# Patient Record
Sex: FEMALE | Race: WHITE | Employment: OTHER | URBAN - METROPOLITAN AREA
[De-identification: names, ages, dates, MRNs, and addresses within clinical notes are randomized per-mention and may not be internally consistent; named-entity substitution may affect disease eponyms.]

---

## 2020-01-29 ENCOUNTER — OFFICE VISIT (OUTPATIENT)
Dept: NEUROSURGERY | Age: 85
End: 2020-01-29
Payer: MEDICARE

## 2020-01-29 VITALS
BODY MASS INDEX: 28.32 KG/M2 | HEART RATE: 68 BPM | SYSTOLIC BLOOD PRESSURE: 145 MMHG | WEIGHT: 170 LBS | HEIGHT: 65 IN | DIASTOLIC BLOOD PRESSURE: 79 MMHG

## 2020-01-29 PROCEDURE — 1090F PRES/ABSN URINE INCON ASSESS: CPT | Performed by: NEUROLOGICAL SURGERY

## 2020-01-29 PROCEDURE — G8427 DOCREV CUR MEDS BY ELIG CLIN: HCPCS | Performed by: NEUROLOGICAL SURGERY

## 2020-01-29 PROCEDURE — 99203 OFFICE O/P NEW LOW 30 MIN: CPT | Performed by: NEUROLOGICAL SURGERY

## 2020-01-29 PROCEDURE — G8484 FLU IMMUNIZE NO ADMIN: HCPCS | Performed by: NEUROLOGICAL SURGERY

## 2020-01-29 PROCEDURE — G8417 CALC BMI ABV UP PARAM F/U: HCPCS | Performed by: NEUROLOGICAL SURGERY

## 2020-01-29 RX ORDER — CALCITRIOL 0.25 UG/1
0.25 CAPSULE, LIQUID FILLED ORAL DAILY
COMMUNITY

## 2020-01-29 RX ORDER — ALLOPURINOL 300 MG/1
300 TABLET ORAL DAILY
COMMUNITY

## 2020-01-29 RX ORDER — POTASSIUM CITRATE 10 MEQ/1
TABLET, EXTENDED RELEASE ORAL
COMMUNITY

## 2020-01-29 ASSESSMENT — ENCOUNTER SYMPTOMS
BACK PAIN: 1
PHOTOPHOBIA: 0
SHORTNESS OF BREATH: 0
ABDOMINAL PAIN: 0
TROUBLE SWALLOWING: 0

## 2020-01-29 NOTE — PROGRESS NOTES
breath) on exertion     Thyroid disease     hypo    Unspecified cerebral artery occlusion with cerebral infarction     4 minor stroke  last      Past Surgical History:   Procedure Laterality Date    BREAST SURGERY Right     biopsy    CARDIAC CATHETERIZATION   ? \" during procedure\" in [address]        History reviewed. No pertinent family history.    Social History     Socioeconomic History    Marital status:      Spouse name: Not on file    Number of children: Not on file    Years of education: Not on file    Highest education level: Not on file   Occupational History    Not on file   Social Needs    Financial resource strain: Not on file    Food insecurity:     Worry: Not on file     Inability: Not on file    Transportation needs:     Medical: Not on file     Non-medical: Not on file   Tobacco Use    Smoking status: Never Smoker    Smokeless tobacco: Never Used   Substance and Sexual Activity    Alcohol use: No    Drug use: No    Sexual activity: Not on file   Lifestyle    Physical activity:     Days per week: Not on file     Minutes per session: Not on file    Stress: Not on file   Relationships    Social connections:     Talks on phone: Not on file     Gets together: Not on file     Attends Buddhism service: Not on file     Active member of club or organization: Not on file     Attends meetings of clubs or organizations: Not on file     Relationship status: Not on file    Intimate partner violence:     Fear of current or ex partner: Not on file     Emotionally abused: Not on file     Physically abused: Not on file     Forced sexual activity: Not on file   Other Topics Concern    Not on file   Social History Narrative    Not on file       Medications:   Current Outpatient Medications   Medication Sig Dispense Refill    calcitRIOL (ROCALTROL) 0.25 MCG capsule Take 0.25 mcg by mouth daily      allopurinol (ZYLOPRIM) 300 MG tablet Take

## 2020-03-11 ENCOUNTER — HOSPITAL ENCOUNTER (OUTPATIENT)
Age: 85
Discharge: HOME | End: 2020-03-13
Payer: MEDICARE

## 2020-03-11 PROCEDURE — 87088 URINE BACTERIA CULTURE: CPT

## 2020-03-11 PROCEDURE — 87186 SC STD MICRODIL/AGAR DIL: CPT

## 2020-03-13 LAB
ORGANISM: ABNORMAL
URINE CULTURE, ROUTINE: ABNORMAL

## 2020-05-04 ENCOUNTER — PREP FOR PROCEDURE (OUTPATIENT)
Dept: UROLOGY | Age: 85
End: 2020-05-04

## 2020-05-04 RX ORDER — CIPROFLOXACIN 2 MG/ML
400 INJECTION, SOLUTION INTRAVENOUS
Status: CANCELLED | OUTPATIENT
Start: 2020-05-04 | End: 2020-05-04

## 2020-05-04 RX ORDER — SODIUM CHLORIDE 9 MG/ML
INJECTION, SOLUTION INTRAVENOUS CONTINUOUS
Status: CANCELLED | OUTPATIENT
Start: 2020-05-04

## 2021-09-17 NOTE — H&P
[address]                                HISTORY AND PHYSICAL    PATIENT NAME: Benedicto Toribio                   :        1935  MED REC NO:   [id number]                            ROOM:  ACCOUNT NO:   [de-identified]                           ADMIT DATE: 2021  PROVIDER:     Aamir Murray MD    CHIEF COMPLAINT:  Upper abdominal pain. HISTORY OF PRESENT ILLNESS:  The patient is 82year-old with upper abdominal  pains and discomfort. ALLERGIES:  None. HABITS:  Denies. CURRENT MEDICATIONS:  Tramadol, alprazolam , pantoprazole, verapamil, Lasix,  simvastatin, calcitriol, Synthroid, lisinopril, allopurinol, Coumadin,  multivitamins. PAST MEDICAL HISTORY:  Hypothyroidism, rheumatoid arthritis, heart block,  atrial fibrillation, gout, renal failure, gait disability, remote stroke. PAST SURGICAL HISTORY:  Peptic ulcer, leg surgery, cardiac cath, breast  biopsy. PHYSICAL EXAMINATION:  GENERAL:  Alert and oriented. VITAL SIGNS:  /80, pulse 72, respirations 16, temperature 97.1. HEENT:  One oral cavity negative. NECK:  Negative. The peripheral lymph nodes are not palpable. LUNGS:  Clear to auscultation. BREASTS:  Deferred. CARDIOVASCULAR:  Heart sounds regular. ABDOMEN:  Soft. No palpable masses. RECTAL:  Deferred. GENITALIA:  Deferred. EXTREMITIES:  Negative. NEUROLOGIC:  Oriented to time and space. No gross deficit. IMPRESSION:  An 82-year-old female with dyspeptic symptoms and upper  gastrointestinal distress with history of complicated peptic ulcer disease  (bleeding episode) that required surgery. She is still on anticoagulation. PLAN:  EGD.         Perez Barraza MD    D: 2021 13:26:38       T: 2021 13:29:00     [email or web address]  Job#: [id number]     Doc#: [id number]

## 2021-09-22 ENCOUNTER — ANESTHESIA EVENT (OUTPATIENT)
Dept: ENDOSCOPY | Age: 86
End: 2021-09-22
Payer: MEDICARE

## 2021-09-22 ASSESSMENT — ENCOUNTER SYMPTOMS: SHORTNESS OF BREATH: 1

## 2021-09-22 NOTE — ANESTHESIA PRE PROCEDURE
Department of Anesthesiology  Preprocedure Note       Name:  Mal Joseph   Age:  82 y.o.  :  1935                                          MRN:  [id number]         Date:  2021      Surgeon: Rocio Murphy):  Best Bynum MD    Procedure: Procedure(s):  EGD ESOPHAGOGASTRODUODENOSCOPY    Medications prior to admission:   Prior to Admission medications    Medication Sig Start Date End Date Taking? Authorizing Provider   calcitRIOL (ROCALTROL) 0.25 MCG capsule Take 0.25 mcg by mouth daily    Historical Provider, MD   allopurinol (ZYLOPRIM) 300 MG tablet Take 300 mg by mouth daily    Historical Provider, MD   potassium citrate (UROCIT-K) 10 MEQ (1080 MG) extended release tablet Take by mouth 3 times daily (with meals)    Historical Provider, MD   citric acid-potassium citrate (POLYCITRA) 1100-334 MG/5ML solution Take  by mouth 3 times daily (with meals). Historical Provider, MD   predniSONE (DELTASONE) 5 MG tablet Take 5 mg by mouth daily. Historical Provider, MD   pyridoxine (B-6) 100 MG tablet Take 100 mg by mouth daily. Historical Provider, MD   lisinopril (PRINIVIL;ZESTRIL) 20 MG tablet Take 20 mg by mouth every morning. Historical Provider, MD   verapamil (VERELAN PM) 240 MG CR capsule Take 240 mg by mouth every morning. Historical Provider, MD   furosemide (LASIX) 40 MG tablet Take 20 mg by mouth daily     Historical Provider, MD   ALPRAZolam (XANAX) 0.25 MG tablet Take 0.25 mg by mouth 2 times daily as needed. Historical Provider, MD   simvastatin (ZOCOR) 10 MG tablet Take 10 mg by mouth nightly. Historical Provider, MD   levothyroxine (SYNTHROID) 50 MCG tablet Take 50 mcg by mouth Daily. Historical Provider, MD   hydroxychloroquine (PLAQUENIL) 200 MG tablet Take 200 mg by mouth every morning. Historical Provider, MD   warfarin (COUMADIN) 5 MG tablet Take 6 mg by mouth Daily.  Stopped for procedure--Last dose 13    Historical Provider, MD   therapeutic multivitamin-minerals (THERAGRAN-M) tablet Take 1 tablet by mouth daily. Historical Provider, MD   Omega-3 Fatty Acids (FISH OIL) 1000 MG CPDR Take 1 capsule by mouth daily. Historical Provider, MD   Vitamin D (CHOLECALCIFEROL) 1000 UNITS CAPS capsule Take 2,000 Units by mouth daily. Vitamin d 3    Historical Provider, MD   traMADol (ULTRAM) 50 MG tablet Take 50 mg by mouth 2 times daily. Historical Provider, MD   Acetaminophen (EQ ARTHRITIS PAIN PO) Take 650 mg by mouth 2 times daily. Historical Provider, MD       Current medications:    No current facility-administered medications for this encounter. Current Outpatient Medications   Medication Sig Dispense Refill    calcitRIOL (ROCALTROL) 0.25 MCG capsule Take 0.25 mcg by mouth daily      allopurinol (ZYLOPRIM) 300 MG tablet Take 300 mg by mouth daily      potassium citrate (UROCIT-K) 10 MEQ (1080 MG) extended release tablet Take by mouth 3 times daily (with meals)      citric acid-potassium citrate (POLYCITRA) 1100-334 MG/5ML solution Take  by mouth 3 times daily (with meals).  predniSONE (DELTASONE) 5 MG tablet Take 5 mg by mouth daily.  pyridoxine (B-6) 100 MG tablet Take 100 mg by mouth daily.  lisinopril (PRINIVIL;ZESTRIL) 20 MG tablet Take 20 mg by mouth every morning.  verapamil (VERELAN PM) 240 MG CR capsule Take 240 mg by mouth every morning.  furosemide (LASIX) 40 MG tablet Take 20 mg by mouth daily       ALPRAZolam (XANAX) 0.25 MG tablet Take 0.25 mg by mouth 2 times daily as needed.  simvastatin (ZOCOR) 10 MG tablet Take 10 mg by mouth nightly.  levothyroxine (SYNTHROID) 50 MCG tablet Take 50 mcg by mouth Daily.  hydroxychloroquine (PLAQUENIL) 200 MG tablet Take 200 mg by mouth every morning.  warfarin (COUMADIN) 5 MG tablet Take 6 mg by mouth Daily. Stopped for procedure--Last dose 9/6/13      therapeutic multivitamin-minerals (THERAGRAN-M) tablet Take 1 tablet by mouth daily.       Omega-3 Fatty Acids (FISH OIL) 1000 MG CPDR Take 1 capsule by mouth daily.  Vitamin D (CHOLECALCIFEROL) 1000 UNITS CAPS capsule Take 2,000 Units by mouth daily. Vitamin d 3      traMADol (ULTRAM) 50 MG tablet Take 50 mg by mouth 2 times daily.  Acetaminophen (EQ ARTHRITIS PAIN PO) Take 650 mg by mouth 2 times daily. Allergies: Allergies   Allergen Reactions    Penicillins Anaphylaxis    Sulfa Antibiotics Nausea And Vomiting     Stomach pain        Problem List:  There is no problem list on file for this patient. Past Medical History:        Diagnosis Date    AF [facility]at[facility] fibrillation) ([facility])     Anemia     Anesthesia complication     \"\"during heart cath  and another surgery duodenal ulcer 3rd day post  states    stopped breathing    Arthritis     rheumatoid    Back pain, chronic     ruptured, buldging, sciatica     Bursitis of hip     bilateral   injections given 13    CHF (congestive heart failure) ([facility])     Duodenal ulcer with hemorrhage and perforation ([facility] [address]    Hx of blood clots ? right leg    Hyperlipidemia     Hypertension     SOB (shortness of breath) on exertion     Thyroid disease     hypo    Unspecified cerebral artery occlusion with cerebral infarction     4 minor stroke  last        Past Surgical History:        Procedure Laterality Date    BREAST SURGERY Right     biopsy    CARDIAC CATHETERIZATION   ? \" during procedure\" in [address]         Social History:    Social History     Tobacco Use    Smoking status: Never Smoker    Smokeless tobacco: Never Used   Substance Use Topics    Alcohol use: No                                Counseling given: Not Answered      Vital Signs (Current): There were no vitals filed for this visit.                                            BP Readings from Last 3 Encounters:   20 (!) 145/79   09/10/13 118/60   13 116/64       NPO Status: BMI:   Wt Readings from Last 3 Encounters:   20 170 lb (77.1 kg)   09/10/13 170 lb (77.1 kg)   13 171 lb (77.6 kg)     There is no height or weight on file to calculate BMI.    CBC:   Lab Results   Component Value Date    WBC 6.3 2013    RBC 3.84 2013    HGB 11.6 2013    HCT 33.5 2013    MCV 87.2 2013    RDW 14.1 2013     2013       CMP:   Lab Results   Component Value Date     2013    K 3.9 2013     2013    CO2 30 2013    BUN 15 2013    CREATININE 0.9 2013    LABGLOM >60 2013    GLUCOSE 86 2013    CALCIUM 9.5 2013       POC Tests: No results for input(s): POCGLU, POCNA, POCK, POCCL, POCBUN, POCHEMO, POCHCT in the last 72 hours. Coags:   Lab Results   Component Value Date    PROTIME 14.0 09/10/2013    INR 1.5 09/10/2013    APTT 32.0 09/10/2013       HCG (If Applicable): No results found for: PREGTESTUR, PREGSERUM, HCG, HCGQUANT     ABGs: No results found for: PHART, PO2ART, SPR9ORW, MYN8HBE, BEART, H3ZXDAJB     Type & Screen (If Applicable):  No results found for: LABABO, LABRH    Drug/Infectious Status (If Applicable):  No results found for: HIV, HEPCAB    COVID-19 Screening (If Applicable): No results found for: COVID19        Anesthesia Evaluation  Patient summary reviewed history of anesthetic complications ( \"\"during heart cath  and another surgery duodenal ulcer 3rd day post  states    stopped breathing):   Airway: Mallampati: III  TM distance: >3 FB   Neck ROM: full  Mouth opening: > = 3 FB Dental:      Comment: Upper edentulous, lower front incisors remain. Right lateral incisor is badly deteriorated.     Pulmonary:normal exam  breath sounds clear to auscultation  (+) shortness of breath:                             Cardiovascular:  Exercise tolerance: poor (<4 METS),   (+) hypertension:, CHF:, GARCIA:, hyperlipidemia        Rhythm: regular  Rate: normal                    Neuro/Psych:   (+) CVA ( Unspecified cerebral artery occlusion with cerebral infarction , CVA x 2 with left sided weakness which improved.):, neuromuscular disease ( Back pain, chronic ruptured, buldging, sciatica  Bursitis of hip bilateral   injections given 6/28/13):,             GI/Hepatic/Renal:   (+) PUD,          ROS comment: Duodenal ulcer with hemorrhage and perforation . Endo/Other:                      ROS comment: Gout Abdominal:             Vascular: negative vascular ROS. Other Findings:           Anesthesia Plan      MAC     ASA 4       Induction: intravenous. Anesthetic plan and risks discussed with patient. Plan discussed with CRNA.                 Margarette Ibanez MD   9/22/2021

## 2021-09-22 NOTE — PROGRESS NOTES
[address] [facility]                                                                                                                    PRE OP INSTRUCTIONS FOR  Ned Melendez        Date: 9/22/2021    Date of surgery: 9/22/2021   Arrival Time: Hospital will call you between 5pm and 7pm with your final arrival time for surgery    1. Do not eat or drink anything after  Midnight  prior to surgery. This includes no water, chewing gum, mints or ice chips. 2. Take the following medications with a small sip of water on the morning of Surgery:  Take verapamil  dos with sip water    3. Diabetics may take evening dose of insulin but none after midnight. If you feel symptomatic or low blood sugar morning of surgery drink 1-2 ounces of apple juice only. 4. Aspirin, Ibuprofen, Advil, Naproxen, Vitamin E and other Anti-inflammatory products should be stopped  before surgery  as directed by your physician. Take Tylenol only unless instructed otherwise by your surgeon. 5. Check with your Doctor regarding stopping Plavix, Coumadin, Lovenox, Eliquis, Effient, or other blood thinners. 6. Do not smoke,use illicit drugs and do not drink any alcoholic beverages 24 hours prior to surgery. 7. You may brush your teeth the morning of surgery. DO NOT SWALLOW WATER    8. You MUST make arrangements for a responsible adult to take you home after your surgery. You will not be allowed to leave alone or drive yourself home. It is strongly suggested someone stay with you the first 24 hrs. Your surgery will be cancelled if you do not have a ride home. 9. PEDIATRIC PATIENTS ONLY:  A parent/legal guardian must accompany a child scheduled for surgery and plan to stay at the hospital until the child is discharged. Please do not bring other children with you.     10. Please wear simple, loose fitting clothing to the hospital.  Do not bring valuables (money, credit cards, checkbooks, etc.) Do not wear any makeup

## 2021-09-23 ENCOUNTER — ANESTHESIA (OUTPATIENT)
Dept: ENDOSCOPY | Age: 86
End: 2021-09-23
Payer: MEDICARE

## 2021-09-23 ENCOUNTER — HOSPITAL ENCOUNTER (OUTPATIENT)
Age: 86
Setting detail: OUTPATIENT SURGERY
Discharge: HOME | End: 2021-09-23
Attending: INTERNAL MEDICINE | Admitting: INTERNAL MEDICINE
Payer: MEDICARE

## 2021-09-23 VITALS — SYSTOLIC BLOOD PRESSURE: 107 MMHG | DIASTOLIC BLOOD PRESSURE: 58 MMHG | OXYGEN SATURATION: 95 %

## 2021-09-23 VITALS
OXYGEN SATURATION: 93 % | RESPIRATION RATE: 18 BRPM | HEART RATE: 60 BPM | BODY MASS INDEX: 28.16 KG/M2 | TEMPERATURE: 60 F | HEIGHT: 65 IN | DIASTOLIC BLOOD PRESSURE: 73 MMHG | WEIGHT: 169 LBS | SYSTOLIC BLOOD PRESSURE: 135 MMHG

## 2021-09-23 LAB
APTT: 41.6 SEC (ref 24.5–35.1)
INR BLD: 1.4
PROTHROMBIN TIME: 16.3 SEC (ref 9.3–12.4)

## 2021-09-23 PROCEDURE — 3700000000 HC ANESTHESIA ATTENDED CARE: Performed by: INTERNAL MEDICINE

## 2021-09-23 PROCEDURE — 3700000001 HC ADD 15 MINUTES (ANESTHESIA): Performed by: INTERNAL MEDICINE

## 2021-09-23 PROCEDURE — 2580000003 HC RX 258: Performed by: INTERNAL MEDICINE

## 2021-09-23 PROCEDURE — 88342 IMHCHEM/IMCYTCHM 1ST ANTB: CPT

## 2021-09-23 PROCEDURE — 6360000002 HC RX W HCPCS: Performed by: INTERNAL MEDICINE

## 2021-09-23 PROCEDURE — 2580000003 HC RX 258: Performed by: ANESTHESIOLOGY

## 2021-09-23 PROCEDURE — 36415 COLL VENOUS BLD VENIPUNCTURE: CPT

## 2021-09-23 PROCEDURE — 3609012400 HC EGD TRANSORAL BIOPSY SINGLE/MULTIPLE: Performed by: INTERNAL MEDICINE

## 2021-09-23 PROCEDURE — 88305 TISSUE EXAM BY PATHOLOGIST: CPT

## 2021-09-23 PROCEDURE — 6360000002 HC RX W HCPCS: Performed by: NURSE ANESTHETIST, CERTIFIED REGISTERED

## 2021-09-23 PROCEDURE — 2709999900 HC NON-CHARGEABLE SUPPLY: Performed by: INTERNAL MEDICINE

## 2021-09-23 PROCEDURE — 85610 PROTHROMBIN TIME: CPT

## 2021-09-23 PROCEDURE — 7100000010 HC PHASE II RECOVERY - FIRST 15 MIN: Performed by: INTERNAL MEDICINE

## 2021-09-23 PROCEDURE — 7100000011 HC PHASE II RECOVERY - ADDTL 15 MIN: Performed by: INTERNAL MEDICINE

## 2021-09-23 PROCEDURE — 2580000003 HC RX 258: Performed by: NURSE ANESTHETIST, CERTIFIED REGISTERED

## 2021-09-23 PROCEDURE — 85730 THROMBOPLASTIN TIME PARTIAL: CPT

## 2021-09-23 RX ORDER — PROPOFOL 10 MG/ML
INJECTION, EMULSION INTRAVENOUS PRN
Status: DISCONTINUED | OUTPATIENT
Start: 2021-09-23 | End: 2021-09-23 | Stop reason: SDUPTHER

## 2021-09-23 RX ORDER — SODIUM CHLORIDE 0.9 % (FLUSH) 0.9 %
5-40 SYRINGE (ML) INJECTION PRN
Status: DISCONTINUED | OUTPATIENT
Start: 2021-09-23 | End: 2021-09-23 | Stop reason: HOSPADM

## 2021-09-23 RX ORDER — SODIUM CHLORIDE, SODIUM LACTATE, POTASSIUM CHLORIDE, CALCIUM CHLORIDE 600; 310; 30; 20 MG/100ML; MG/100ML; MG/100ML; MG/100ML
INJECTION, SOLUTION INTRAVENOUS CONTINUOUS
Status: DISCONTINUED | OUTPATIENT
Start: 2021-09-23 | End: 2021-09-23 | Stop reason: HOSPADM

## 2021-09-23 RX ORDER — SODIUM CHLORIDE 9 MG/ML
INJECTION, SOLUTION INTRAVENOUS CONTINUOUS PRN
Status: DISCONTINUED | OUTPATIENT
Start: 2021-09-23 | End: 2021-09-23 | Stop reason: SDUPTHER

## 2021-09-23 RX ADMIN — VANCOMYCIN HYDROCHLORIDE 500 MG: 500 INJECTION, POWDER, LYOPHILIZED, FOR SOLUTION INTRAVENOUS at 07:44

## 2021-09-23 RX ADMIN — PROPOFOL 10 MG: 10 INJECTION, EMULSION INTRAVENOUS at 08:06

## 2021-09-23 RX ADMIN — SODIUM CHLORIDE: 9 INJECTION, SOLUTION INTRAVENOUS at 07:49

## 2021-09-23 RX ADMIN — SODIUM CHLORIDE, POTASSIUM CHLORIDE, SODIUM LACTATE AND CALCIUM CHLORIDE: 600; 310; 30; 20 INJECTION, SOLUTION INTRAVENOUS at 07:44

## 2021-09-23 RX ADMIN — PROPOFOL 20 MG: 10 INJECTION, EMULSION INTRAVENOUS at 08:02

## 2021-09-23 RX ADMIN — PROPOFOL 40 MG: 10 INJECTION, EMULSION INTRAVENOUS at 07:59

## 2021-09-23 RX ADMIN — PROPOFOL 20 MG: 10 INJECTION, EMULSION INTRAVENOUS at 08:01

## 2021-09-23 RX ADMIN — PROPOFOL 30 MG: 10 INJECTION, EMULSION INTRAVENOUS at 08:00

## 2021-09-23 RX ADMIN — PROPOFOL 20 MG: 10 INJECTION, EMULSION INTRAVENOUS at 08:04

## 2021-09-23 ASSESSMENT — PAIN - FUNCTIONAL ASSESSMENT: PAIN_FUNCTIONAL_ASSESSMENT: 0-10

## 2021-09-23 ASSESSMENT — PAIN SCALES - GENERAL: PAINLEVEL_OUTOF10: 9

## 2021-09-23 ASSESSMENT — PAIN DESCRIPTION - PAIN TYPE: TYPE: CHRONIC PAIN

## 2021-09-23 NOTE — H&P
H&p reviewed. No changes. Blood pressure (!) 191/86, pulse 59, temperature 97.5 °F (36.4 °C), temperature source Infrared, resp. rate 18, height 5' 4.5\" (1.638 m), weight 169 lb (76.7 kg), SpO2 96 %, not currently breastfeeding. The patient was counseled at length about the risks of margarita Covid-19 during their perioperative period and any recovery window from their procedure. The patient was made aware that margarita Covid-19  may worsen their prognosis for recovering from their procedure  and lend to a higher morbidity and/or mortality risk. All material risks, benefits, and reasonable alternatives including postponing the procedure were discussed. The patient does wish to proceed with the procedure at this time.

## 2021-09-23 NOTE — ANESTHESIA POSTPROCEDURE EVALUATION
Department of Anesthesiology  Postprocedure Note    Patient: Mal Joseph  MRN: [id number]  YOB: 1935  Date of evaluation: 9/23/2021  Time:  8:26 AM     Procedure Summary     Date: 09/23/21 Room / Location: [address] [place] [address] 01 / [address] [facility]    Anesthesia Start: 3689 Anesthesia Stop: 0820    Procedure: EGD BIOPSY (N/A ) Diagnosis: (P.U.D.)    Surgeons: Best Bynum MD Responsible Provider: Jo Ann Olson MD    Anesthesia Type: MAC ASA Status: 4          Anesthesia Type: MAC    Kelsie Phase I:      Kelsie Phase II:      Last vitals: Reviewed and per EMR flowsheets.        Anesthesia Post Evaluation    Patient location during evaluation: bedside  Patient participation: complete - patient participated  Level of consciousness: awake  Pain score: 0  Airway patency: patent  Nausea & Vomiting: no nausea and no vomiting  Complications: no  Cardiovascular status: hemodynamically stable  Respiratory status: acceptable  Hydration status: euvolemic

## 2022-05-27 ENCOUNTER — HOSPITAL ENCOUNTER (OUTPATIENT)
Age: 87
Discharge: HOME | End: 2022-05-29
Payer: MEDICARE

## 2022-05-27 ENCOUNTER — HOSPITAL ENCOUNTER (OUTPATIENT)
Dept: GENERAL RADIOLOGY | Age: 87
Discharge: HOME | End: 2022-05-29
Payer: MEDICARE

## 2022-05-27 ENCOUNTER — HOSPITAL ENCOUNTER (OUTPATIENT)
Dept: NUCLEAR MEDICINE | Age: 87
Discharge: HOME | End: 2022-05-27
Payer: MEDICARE

## 2022-05-27 DIAGNOSIS — N25.9 DISORDER RESULTING FROM IMPAIRED RENAL TUBULAR FUNCTION: ICD-10-CM

## 2022-05-27 DIAGNOSIS — N20.0 CALCULUS OF KIDNEY: ICD-10-CM

## 2022-05-27 PROCEDURE — 74018 RADEX ABDOMEN 1 VIEW: CPT

## 2022-06-06 ENCOUNTER — HOSPITAL ENCOUNTER (OUTPATIENT)
Dept: NUCLEAR MEDICINE | Age: 87
Discharge: HOME | End: 2022-06-06
Payer: MEDICARE

## 2022-06-06 ENCOUNTER — HOSPITAL ENCOUNTER (OUTPATIENT)
Dept: GENERAL RADIOLOGY | Age: 87
Discharge: HOME | End: 2022-06-08
Payer: MEDICARE

## 2022-06-06 DIAGNOSIS — N20.0 CALCULUS OF KIDNEY: ICD-10-CM

## 2022-06-06 PROCEDURE — 3430000000 HC RX DIAGNOSTIC RADIOPHARMACEUTICAL: Performed by: RADIOLOGY

## 2022-06-06 PROCEDURE — 74018 RADEX ABDOMEN 1 VIEW: CPT

## 2022-06-06 PROCEDURE — A9562 TC99M MERTIATIDE: HCPCS | Performed by: RADIOLOGY

## 2022-06-06 PROCEDURE — 6360000002 HC RX W HCPCS: Performed by: RADIOLOGY

## 2022-06-06 PROCEDURE — 78708 K FLOW/FUNCT IMAGE W/DRUG: CPT

## 2022-06-06 RX ADMIN — FUROSEMIDE 40 MG: 10 INJECTION, SOLUTION INTRAMUSCULAR; INTRAVENOUS at 11:46

## 2022-06-06 RX ADMIN — Medication 9 MILLICURIE: at 11:49

## 2023-01-18 ENCOUNTER — HOSPITAL ENCOUNTER (INPATIENT)
Age: 88
LOS: 3 days | Discharge: HOME | DRG: 378 | End: 2023-01-21
Attending: EMERGENCY MEDICINE | Admitting: INTERNAL MEDICINE
Payer: MEDICARE

## 2023-01-18 ENCOUNTER — APPOINTMENT (OUTPATIENT)
Dept: GENERAL RADIOLOGY | Age: 88
DRG: 378 | End: 2023-01-18
Payer: MEDICARE

## 2023-01-18 DIAGNOSIS — K92.2 GASTROINTESTINAL HEMORRHAGE, UNSPECIFIED GASTROINTESTINAL HEMORRHAGE TYPE: Primary | ICD-10-CM

## 2023-01-18 DIAGNOSIS — R55 NEAR SYNCOPE: ICD-10-CM

## 2023-01-18 DIAGNOSIS — E87.20 LACTIC ACID ACIDOSIS: ICD-10-CM

## 2023-01-18 DIAGNOSIS — D64.9 ANEMIA, UNSPECIFIED TYPE: ICD-10-CM

## 2023-01-18 DIAGNOSIS — N17.9 AKI (ACUTE KIDNEY INJURY) (HCC): ICD-10-CM

## 2023-01-18 LAB
ABO/RH: NORMAL
ALBUMIN SERPL-MCNC: 3.9 G/DL (ref 3.5–5.2)
ALP BLD-CCNC: 84 U/L (ref 35–104)
ALT SERPL-CCNC: 8 U/L (ref 0–32)
ANION GAP SERPL CALCULATED.3IONS-SCNC: 14 MMOL/L (ref 7–16)
ANTIBODY SCREEN: NORMAL
APTT: 56.3 SEC (ref 24.5–35.1)
AST SERPL-CCNC: 20 U/L (ref 0–31)
BASOPHILS ABSOLUTE: 0.03 E9/L (ref 0–0.2)
BASOPHILS RELATIVE PERCENT: 0.3 % (ref 0–2)
BILIRUB SERPL-MCNC: 0.3 MG/DL (ref 0–1.2)
BILIRUBIN DIRECT: <0.2 MG/DL (ref 0–0.3)
BILIRUBIN, INDIRECT: NORMAL MG/DL (ref 0–1)
BUN BLDV-MCNC: 41 MG/DL (ref 6–23)
CALCIUM SERPL-MCNC: 9.7 MG/DL (ref 8.6–10.2)
CHLORIDE BLD-SCNC: 104 MMOL/L (ref 98–107)
CO2: 25 MMOL/L (ref 22–29)
CREAT SERPL-MCNC: 1.5 MG/DL (ref 0.5–1)
EKG ATRIAL RATE: 66 BPM
EKG Q-T INTERVAL: 388 MS
EKG QRS DURATION: 92 MS
EKG QTC CALCULATION (BAZETT): 439 MS
EKG R AXIS: -45 DEGREES
EKG T AXIS: -9 DEGREES
EKG VENTRICULAR RATE: 77 BPM
EOSINOPHILS ABSOLUTE: 0.07 E9/L (ref 0.05–0.5)
EOSINOPHILS RELATIVE PERCENT: 0.7 % (ref 0–6)
GFR SERPL CREATININE-BSD FRML MDRD: 34 ML/MIN/1.73
GLUCOSE BLD-MCNC: 126 MG/DL (ref 74–99)
HCT VFR BLD CALC: 37.1 % (ref 34–48)
HCT VFR BLD CALC: 39.1 % (ref 34–48)
HEMOGLOBIN: 11.6 G/DL (ref 11.5–15.5)
HEMOGLOBIN: 12.5 G/DL (ref 11.5–15.5)
IMMATURE GRANULOCYTES #: 0.04 E9/L
IMMATURE GRANULOCYTES %: 0.4 % (ref 0–5)
INR BLD: 2.9
LACTIC ACID, SEPSIS: 1.7 MMOL/L (ref 0.5–1.9)
LACTIC ACID, SEPSIS: 4.3 MMOL/L (ref 0.5–1.9)
LYMPHOCYTES ABSOLUTE: 0.84 E9/L (ref 1.5–4)
LYMPHOCYTES RELATIVE PERCENT: 8.1 % (ref 20–42)
MCH RBC QN AUTO: 28 PG (ref 26–35)
MCHC RBC AUTO-ENTMCNC: 29.7 % (ref 32–34.5)
MCV RBC AUTO: 94.2 FL (ref 80–99.9)
MONOCYTES ABSOLUTE: 0.59 E9/L (ref 0.1–0.95)
MONOCYTES RELATIVE PERCENT: 5.7 % (ref 2–12)
NEUTROPHILS ABSOLUTE: 8.78 E9/L (ref 1.8–7.3)
NEUTROPHILS RELATIVE PERCENT: 84.8 % (ref 43–80)
PDW BLD-RTO: 15.5 FL (ref 11.5–15)
PLATELET # BLD: 205 E9/L (ref 130–450)
PMV BLD AUTO: 11.4 FL (ref 7–12)
POTASSIUM SERPL-SCNC: 5.1 MMOL/L (ref 3.5–5)
PRO-BNP: 1487 PG/ML (ref 0–450)
PROTHROMBIN TIME: 33.6 SEC (ref 9.3–12.4)
RBC # BLD: 4.15 E12/L (ref 3.5–5.5)
SODIUM BLD-SCNC: 143 MMOL/L (ref 132–146)
TOTAL PROTEIN: 6.7 G/DL (ref 6.4–8.3)
TROPONIN, HIGH SENSITIVITY: 26 NG/L (ref 0–9)
TROPONIN, HIGH SENSITIVITY: 30 NG/L (ref 0–9)
WBC # BLD: 10.4 E9/L (ref 4.5–11.5)

## 2023-01-18 PROCEDURE — 80076 HEPATIC FUNCTION PANEL: CPT

## 2023-01-18 PROCEDURE — 80048 BASIC METABOLIC PNL TOTAL CA: CPT

## 2023-01-18 PROCEDURE — 86901 BLOOD TYPING SEROLOGIC RH(D): CPT

## 2023-01-18 PROCEDURE — 99285 EMERGENCY DEPT VISIT HI MDM: CPT

## 2023-01-18 PROCEDURE — 86850 RBC ANTIBODY SCREEN: CPT

## 2023-01-18 PROCEDURE — C9113 INJ PANTOPRAZOLE SODIUM, VIA: HCPCS | Performed by: STUDENT IN AN ORGANIZED HEALTH CARE EDUCATION/TRAINING PROGRAM

## 2023-01-18 PROCEDURE — 71045 X-RAY EXAM CHEST 1 VIEW: CPT

## 2023-01-18 PROCEDURE — 83605 ASSAY OF LACTIC ACID: CPT

## 2023-01-18 PROCEDURE — 93005 ELECTROCARDIOGRAM TRACING: CPT

## 2023-01-18 PROCEDURE — 85018 HEMOGLOBIN: CPT

## 2023-01-18 PROCEDURE — 83880 ASSAY OF NATRIURETIC PEPTIDE: CPT

## 2023-01-18 PROCEDURE — 2580000003 HC RX 258: Performed by: STUDENT IN AN ORGANIZED HEALTH CARE EDUCATION/TRAINING PROGRAM

## 2023-01-18 PROCEDURE — 84484 ASSAY OF TROPONIN QUANT: CPT

## 2023-01-18 PROCEDURE — 86900 BLOOD TYPING SEROLOGIC ABO: CPT

## 2023-01-18 PROCEDURE — 36415 COLL VENOUS BLD VENIPUNCTURE: CPT

## 2023-01-18 PROCEDURE — 85610 PROTHROMBIN TIME: CPT

## 2023-01-18 PROCEDURE — 85025 COMPLETE CBC W/AUTO DIFF WBC: CPT

## 2023-01-18 PROCEDURE — 6360000002 HC RX W HCPCS: Performed by: STUDENT IN AN ORGANIZED HEALTH CARE EDUCATION/TRAINING PROGRAM

## 2023-01-18 PROCEDURE — 85014 HEMATOCRIT: CPT

## 2023-01-18 PROCEDURE — 6370000000 HC RX 637 (ALT 250 FOR IP): Performed by: INTERNAL MEDICINE

## 2023-01-18 PROCEDURE — 2060000000 HC ICU INTERMEDIATE R&B

## 2023-01-18 PROCEDURE — 85730 THROMBOPLASTIN TIME PARTIAL: CPT

## 2023-01-18 RX ORDER — ATORVASTATIN CALCIUM 10 MG/1
10 TABLET, FILM COATED ORAL NIGHTLY
Status: DISCONTINUED | OUTPATIENT
Start: 2023-01-19 | End: 2023-01-21 | Stop reason: HOSPADM

## 2023-01-18 RX ORDER — FUROSEMIDE 20 MG/1
20 TABLET ORAL DAILY
Status: DISCONTINUED | OUTPATIENT
Start: 2023-01-19 | End: 2023-01-21 | Stop reason: HOSPADM

## 2023-01-18 RX ORDER — ALLOPURINOL 300 MG/1
300 TABLET ORAL DAILY
Status: DISCONTINUED | OUTPATIENT
Start: 2023-01-19 | End: 2023-01-21 | Stop reason: HOSPADM

## 2023-01-18 RX ORDER — POLYETHYLENE GLYCOL 3350 17 G/17G
17 POWDER, FOR SOLUTION ORAL DAILY PRN
Status: DISCONTINUED | OUTPATIENT
Start: 2023-01-18 | End: 2023-01-21 | Stop reason: HOSPADM

## 2023-01-18 RX ORDER — LEVOTHYROXINE SODIUM 0.05 MG/1
50 TABLET ORAL DAILY
Status: DISCONTINUED | OUTPATIENT
Start: 2023-01-19 | End: 2023-01-21 | Stop reason: HOSPADM

## 2023-01-18 RX ORDER — ACETAMINOPHEN 500 MG
500 TABLET ORAL EVERY 6 HOURS PRN
Status: DISCONTINUED | OUTPATIENT
Start: 2023-01-18 | End: 2023-01-21 | Stop reason: HOSPADM

## 2023-01-18 RX ORDER — PROCHLORPERAZINE EDISYLATE 5 MG/ML
5 INJECTION INTRAMUSCULAR; INTRAVENOUS EVERY 6 HOURS PRN
Status: DISCONTINUED | OUTPATIENT
Start: 2023-01-18 | End: 2023-01-21 | Stop reason: HOSPADM

## 2023-01-18 RX ORDER — LISINOPRIL 20 MG/1
20 TABLET ORAL EVERY MORNING
Status: DISCONTINUED | OUTPATIENT
Start: 2023-01-19 | End: 2023-01-19

## 2023-01-18 RX ORDER — ALPRAZOLAM 0.25 MG/1
0.25 TABLET ORAL 2 TIMES DAILY
Status: DISCONTINUED | OUTPATIENT
Start: 2023-01-18 | End: 2023-01-21 | Stop reason: HOSPADM

## 2023-01-18 RX ORDER — WARFARIN SODIUM 5 MG/1
5 TABLET ORAL DAILY
Status: DISCONTINUED | OUTPATIENT
Start: 2023-01-19 | End: 2023-01-19

## 2023-01-18 RX ORDER — M-VIT,TX,IRON,MINS/CALC/FOLIC 27MG-0.4MG
1 TABLET ORAL DAILY
Status: DISCONTINUED | OUTPATIENT
Start: 2023-01-19 | End: 2023-01-21 | Stop reason: HOSPADM

## 2023-01-18 RX ORDER — CALCITRIOL 0.25 UG/1
0.25 CAPSULE, LIQUID FILLED ORAL DAILY
Status: DISCONTINUED | OUTPATIENT
Start: 2023-01-19 | End: 2023-01-21 | Stop reason: HOSPADM

## 2023-01-18 RX ORDER — ENOXAPARIN SODIUM 100 MG/ML
40 INJECTION SUBCUTANEOUS DAILY
Status: DISCONTINUED | OUTPATIENT
Start: 2023-01-18 | End: 2023-01-19

## 2023-01-18 RX ORDER — TRAMADOL HYDROCHLORIDE 50 MG/1
50 TABLET ORAL 2 TIMES DAILY
Status: DISCONTINUED | OUTPATIENT
Start: 2023-01-18 | End: 2023-01-21 | Stop reason: HOSPADM

## 2023-01-18 RX ORDER — 0.9 % SODIUM CHLORIDE 0.9 %
1000 INTRAVENOUS SOLUTION INTRAVENOUS ONCE
Status: COMPLETED | OUTPATIENT
Start: 2023-01-18 | End: 2023-01-18

## 2023-01-18 RX ADMIN — ALPRAZOLAM 0.25 MG: 0.25 TABLET ORAL at 22:38

## 2023-01-18 RX ADMIN — TRAMADOL HYDROCHLORIDE 50 MG: 50 TABLET ORAL at 22:38

## 2023-01-18 RX ADMIN — SODIUM CHLORIDE 1000 ML: 9 INJECTION, SOLUTION INTRAVENOUS at 18:23

## 2023-01-18 RX ADMIN — SODIUM CHLORIDE 80 MG: 9 INJECTION, SOLUTION INTRAVENOUS at 18:24

## 2023-01-18 ASSESSMENT — PAIN DESCRIPTION - DESCRIPTORS: DESCRIPTORS: ACHING

## 2023-01-18 ASSESSMENT — PAIN SCALES - GENERAL: PAINLEVEL_OUTOF10: 6

## 2023-01-18 ASSESSMENT — PAIN DESCRIPTION - LOCATION: LOCATION: BACK

## 2023-01-18 ASSESSMENT — PAIN DESCRIPTION - ORIENTATION: ORIENTATION: LOWER

## 2023-01-18 NOTE — ED PROVIDER NOTES
[address] ENCOUNTER        Pt Name: Karlie Dahl  MRN: [id number]  [place] 1935  Date of evaluation: 2023  Provider: Ye Neri MD  PCP: Zonia Garsia DO  Note Started: 3:06 PM EST 23    CHIEF COMPLAINT       Chief Complaint   Patient presents with    Dizziness     Patient to ED via EMS from [facility] with c/o near syncope. Patient states that she drank carnation instant breakfast, and then walked around the store, and then became dizzy. Patient states that diarrhea is associated. HISTORY OF PRESENT ILLNESS: 1 or more Elements        Limitations to history : None    Karlie Dahl is a 82 y.o. female who presents to the emergency department for lightheadedness. Was apparently ambulating and dry needled today, had near syncopal event, did not fall or head, has a history of GI bleeding and issues with acute anemia. She has a history of A. fib, is on Eliquis. Patient states she attempted to take some instant [place] for her iron as she has issues with anemia, and has had diarrhea for the last 2 days, noted it was black-colored today. She does feel fatigued, does get short of breath with exertion, denies any chest pain, chest tightness, fevers, chills, dysuria, hematuria. Nursing Notes were all reviewed and agreed with or any disagreements were addressed in the HPI. REVIEW OF EXTERNAL NOTE :       As below    REVIEW OF SYSTEMS :      Positives and Pertinent negatives as per HPI. SURGICAL HISTORY     Past Surgical History:   Procedure Laterality Date    BREAST SURGERY Right     biopsy    CARDIAC CATHETERIZATION   ?     \" during procedure\" in [facility] N/A 2021    EGD BIOPSY performed by Ventura Franks MD at [address] [place] [address]       Current Discharge Medication List        CONTINUE these medications which have NOT CHANGED    Details calcitRIOL (ROCALTROL) 0.25 MCG capsule Take 0.25 mcg by mouth daily      allopurinol (ZYLOPRIM) 300 MG tablet Take 300 mg by mouth daily      potassium citrate (UROCIT-K) 10 MEQ (1080 MG) extended release tablet Take by mouth 3 times daily (with meals)      citric acid-potassium citrate (POLYCITRA) 1100-334 MG/5ML solution Take  by mouth 3 times daily (with meals). pyridoxine (B-6) 100 MG tablet Take 100 mg by mouth daily. lisinopril (PRINIVIL;ZESTRIL) 20 MG tablet Take 20 mg by mouth every morning. verapamil (VERELAN PM) 240 MG CR capsule Take 240 mg by mouth every morning. furosemide (LASIX) 40 MG tablet Take 20 mg by mouth daily       ALPRAZolam (XANAX) 0.25 MG tablet Take 0.25 mg by mouth 2 times daily. simvastatin (ZOCOR) 10 MG tablet Take 10 mg by mouth nightly. levothyroxine (SYNTHROID) 50 MCG tablet Take 50 mcg by mouth Daily. hydroxychloroquine (PLAQUENIL) 200 MG tablet Take 200 mg by mouth every morning. warfarin (COUMADIN) 5 MG tablet Take 6 mg by mouth daily at 1800 Stopped for procedure--Last dose 9/20/21      therapeutic multivitamin-minerals (THERAGRAN-M) tablet Take 1 tablet by mouth daily. Omega-3 Fatty Acids (FISH OIL) 1000 MG CPDR Take 1 capsule by mouth daily. Vitamin D (CHOLECALCIFEROL) 1000 UNITS CAPS capsule Take 2,000 Units by mouth daily. Vitamin d 3      traMADol (ULTRAM) 50 MG tablet Take 50 mg by mouth 2 times daily. ALLERGIES     Penicillins and Sulfa antibiotics    FAMILYHISTORY     History reviewed. No pertinent family history.      SOCIAL HISTORY       Social History     Tobacco Use    Smoking status: Never    Smokeless tobacco: Never   Vaping Use    Vaping Use: Never used   Substance Use Topics    Alcohol use: No    Drug use: No       SCREENINGS        K[place]by Coma Scale  Eye Opening: Spontaneous  Best Verbal Response: Oriented  Best Motor Response: Obeys commands  Kirby Coma Scale Score: 15                CIWA Assessment  BP: 133/81  Heart Rate: 78           PHYSICAL EXAM  1 or more Elements     ED Triage Vitals   BP Temp Temp src Pulse Resp SpO2 Height Weight   -- -- -- -- -- -- -- --         Physical Exam  Vitals and nursing note reviewed. Constitutional:       Appearance: Normal appearance. HENT:      Head: Normocephalic and atraumatic. Right Ear: External ear normal.      Left Ear: External ear normal.      Nose: Nose normal.      Mouth/Throat:      Mouth: Mucous membranes are moist.   Eyes:      Extraocular Movements: Extraocular movements intact. Pupils: Pupils are equal, round, and reactive to light. Cardiovascular:      Rate and Rhythm: Normal rate. Rhythm irregular. Pulses: Normal pulses. Heart sounds: Normal heart sounds. Pulmonary:      Effort: Pulmonary effort is normal.      Breath sounds: Normal breath sounds. Abdominal:      General: Abdomen is flat. Bowel sounds are normal. There is no distension. Palpations: Abdomen is soft. There is no mass. Tenderness: There is no abdominal tenderness. Musculoskeletal:         General: Normal range of motion. Cervical back: Normal range of motion and neck supple. Skin:     General: Skin is warm and dry. Neurological:      General: No focal deficit present. Mental Status: She is alert and oriented to person, place, and time. Cranial Nerves: No cranial nerve deficit.                 DIAGNOSTIC RESULTS   LABS:    Labs Reviewed   CBC WITH AUTO DIFFERENTIAL - Abnormal; Notable for the following components:       Result Value    MCHC 29.7 (*)     RDW 15.5 (*)     Neutrophils % 84.8 (*)     Lymphocytes % 8.1 (*)     Neutrophils Absolute 8.78 (*)     Lymphocytes Absolute 0.84 (*)     All other components within normal limits   BASIC METABOLIC PANEL - Abnormal; Notable for the following components:    Potassium 5.1 (*)     Glucose 126 (*)     BUN 41 (*)     Creatinine 1.5 (*)     All other components within normal limits   TROPONIN - Abnormal; Notable for the following components:    Troponin, High Sensitivity 30 (*)     All other components within normal limits   BRAIN NATRIURETIC PEPTIDE - Abnormal; Notable for the following components:    Pro-BNP 1,487 (*)     All other components within normal limits   APTT - Abnormal; Notable for the following components:    aPTT 56.3 (*)     All other components within normal limits   PROTIME-INR - Abnormal; Notable for the following components:    Protime 33.6 (*)     All other components within normal limits   LACTATE, SEPSIS - Abnormal; Notable for the following components:    Lactic Acid, Sepsis 4.3 (*)     All other components within normal limits    Narrative:     Dick Carmine tel. [phone],  Chemistry results called to and read back by alvarado dia rn, 01/18/2023  16:53, by [address], SEPSIS    Narrative:     Draw gray top tube=place on ice.~Separate plasma-keep cold. TROPONIN    Narrative:     High Sensitivity Troponin T   HEMOGLOBIN AND HEMATOCRIT   TYPE AND SCREEN       As interpreted by me, the above displayed labs are abnormal. All other labs obtained during this visit were within normal range or not returned as of this dictation. RADIOLOGY:   Non-plain film images such as CT, Ultrasound and MRI are read by the radiologist. Plain radiographic images are visualized and preliminarily interpreted by the ED Provider with the findings documented in the ED Course. Interpretation per the Radiologist below, if available at the time of this note:    XR CHEST PORTABLE   Final Result   No acute process. Mild cardiomegaly. No results found. No results found.     PROCEDURES   Unless otherwise noted below, none       CRITICAL CARE TIME (.cct)   none    PAST MEDICAL HISTORY/Chronic Conditions Affecting Care      has a past medical history of AF (atrial fibrillation) ([address] Anemia, Anesthesia complication, Arthritis, Back pain, chronic, Bursitis of hip, CHF (congestive heart failure) ([facility] [address] Cholelithiasis, Duodenal ulcer with hemorrhage and perforation ([facility] [address] (1960), blood clots (1960?), Hyperlipidemia, Hypertension, Kidney stones, Sciatic leg pain, SOB (shortness of breath) on exertion, Thyroid disease, and Unspecified cerebral artery occlusion with cerebral infarction. EMERGENCY DEPARTMENT COURSE    Vitals:    Vitals:    01/18/23 1508 01/18/23 1830   BP: 107/63 133/81   Pulse: 76 78   Resp: 16 18   Temp: 97.7 °F (36.5 °C) 97.6 °F (36.4 °C)   TempSrc: Oral Oral   SpO2: 93% 94%   Weight: 176 lb (79.8 kg)    Height: 5' 4\" (1.626 m)        Patient was given the following medications:  Medications   0.9 % sodium chloride bolus (0 mLs IntraVENous Stopped 1/18/23 1824)   pantoprazole (PROTONIX) 80 mg in sodium chloride 0.9 % 100 mL bolus (80 mg IntraVENous New Bag 1/18/23 1824)         Is this patient to be included in the SEP-1 Core Measure due to severe sepsis or septic shock? No Exclusion criteria - the patient is NOT to be included for SEP-1 Core Measure due to: Infection is not suspected          Medical Decision Making/Differential Diagnosis:    CC/HPI Summary, Social Determinants of health, Records Reviewed, DDx, testing done/not done, ED Course, Reassessment, disposition considerations/shared decision making with patient, consults, disposition:        ED Course as of 01/18/23 1856   Wed Jan 18, 2023   1506 On 9/23/2021 patient had EGD performed as she has a history of peptic ulcer disease and gastric bleeding. [JG]   1516 Reviewed H&P note from 9/16/2021 showing patient to have a history of peptic ulcer disease with bleeding episode. This was a note from Dr. Minerva Palomo (gastroenterology). [MS]   [address]:     I have personally performed and/or participated in the history, exam, medical decision making, and procedures and agree with all pertinent clinical information unless otherwise noted.     I have also reviewed and agree with the past medical, family and social history unless otherwise noted. I have discussed this patient in detail with the resident and provided the instruction and education regarding the evidence-based evaluation and treatment of near syncope. Any EKG that may have been performed has been personally reviewed by me and I agree with the documentation as noted by the resident. History: Patient presents to the ED for evaluation of a near syncopal episode that occurred while she was walking around dining a parking lot. Patient attributes this to the fact that she has been up and doing a lot. She does report being increasingly more fatigued than usual.  Patient states her stools have also been dark black. History of upper GI bleed. Patient believes that she is on a blood thinner but does not recall what it is. Thinks she may be on Coumadin. No associated chest pain or shortness of breath. Does not believe that she is more pale than usual    My findings: Tabby Arriaga is a 82 y.o. female whom is in no distress. Physical exam reveals patient lying in bed comfortably. Heart regular rate and rhythm. Lungs are clear to auscultation. No signs of conversational dyspnea or chest muscle use. Abdomen is soft and nontender. No abdominal distention. No pallor or diaphoresis appreciated. Sensation grossly intact. No focal neurological deficits. No ataxia with finger-to-nose. My plan: Symptomatic and supportive care. Appropriate labs and imaging. Electronically signed by Vick Gimenez DO on 1/18/23 at 3:16 PM EST      [MS]   1600 On my interpretation of patient's chest x-ray there is no visible focal infiltrates or pneumothorax. [JG]   1600 EKG: This EKG is signed by emergency department physician.     Rate: 77  Rhythm: Atrial fibrillation  Interpretation: non-specific EKG  Comparison: no previous EKG available      [JG]   1652 MALCOLM present, given fluids, will check a second troponin and admit patient for GI bleeding [JG]   1653 Lactic acid 4 3, giving fluids. [JG]   26 82year-old female presenting emerged from and for near syncope. Considered possible cardiac syncope, EKG was nonconcerning for ST elevation, no significant elevation in troponins to suggest an underlying STEMI. Considered dehydration as well, had lactic acidosis and MALCOLM on BMP to suggest this, consider GI bleeding as patient reported black-colored stools and is on Eliquis and has had issues with prior GI bleeding, given Protonix bolus along with fluids in the emergency department. Lactic acidosis on laboratory work with lactic acid of 4.3, along with an MALCOLM, creatinine 1.5, usually 0.9. Patient did not have significant abdominal pain to suggest an underlying abdominal issue, but does have a history of colon cancer per her. GI was consulted, and patient was admitted by Dr. Annalisa Yeung. [JG]      ED Course User Index  [JG] Wilbur Hernández MD  [MS] Dina Pulido, DO       Medical Decision Making  Problems Addressed:  MALCOLM (acute kidney injury) [facility]): acute illness or injury  Gastrointestinal hemorrhage, unspecified gastrointestinal hemorrhage type: acute illness or injury  Lactic acid acidosis: acute illness or injury  Near syncope: acute illness or injury    Amount and/or Complexity of Data Reviewed  External Data Reviewed: notes. Labs: ordered. Decision-making details documented in ED Course. Radiology: ordered and independent interpretation performed. Decision-making details documented in ED Course. ECG/medicine tests: ordered and independent interpretation performed. Decision-making details documented in ED Course. Risk  Prescription drug management. Decision regarding hospitalization. CONSULTS: (Who and What was discussed)  IP CONSULT TO GI        I am the Primary Clinician of Record. FINAL IMPRESSION      1. Gastrointestinal hemorrhage, unspecified gastrointestinal hemorrhage type    2. Lactic acid acidosis    3. MALCOLM (acute kidney injury) ([facility] [address]    4. Near syncope          DISPOSITION/PLAN     DISPOSITION Admitted 01/18/2023 04:59:06 PM      PATIENT REFERRED TO:  No follow-up provider specified.     DISCHARGE MEDICATIONS:  Current Discharge Medication List          DISCONTINUED MEDICATIONS:  Current Discharge Medication List                 (Please note that portions of this note were completed with a voice recognition program.  Efforts were made to edit the dictations but occasionally words are mis-transcribed.)    Ajay Quiroz MD (electronically signed)           Jatin Ramirez MD  Resident  01/18/23 5080

## 2023-01-19 LAB
ALBUMIN SERPL-MCNC: 3.5 G/DL (ref 3.5–5.2)
ALP BLD-CCNC: 73 U/L (ref 35–104)
ALT SERPL-CCNC: 7 U/L (ref 0–32)
ANION GAP SERPL CALCULATED.3IONS-SCNC: 10 MMOL/L (ref 7–16)
AST SERPL-CCNC: 17 U/L (ref 0–31)
BASOPHILS ABSOLUTE: 0.02 E9/L (ref 0–0.2)
BASOPHILS RELATIVE PERCENT: 0.3 % (ref 0–2)
BILIRUB SERPL-MCNC: 0.7 MG/DL (ref 0–1.2)
BUN BLDV-MCNC: 33 MG/DL (ref 6–23)
CALCIUM SERPL-MCNC: 8.7 MG/DL (ref 8.6–10.2)
CHLORIDE BLD-SCNC: 107 MMOL/L (ref 98–107)
CHOLESTEROL, TOTAL: 193 MG/DL (ref 0–199)
CO2: 24 MMOL/L (ref 22–29)
CREAT SERPL-MCNC: 1.3 MG/DL (ref 0.5–1)
EOSINOPHILS ABSOLUTE: 0.11 E9/L (ref 0.05–0.5)
EOSINOPHILS RELATIVE PERCENT: 1.4 % (ref 0–6)
FERRITIN: 33 NG/ML
FOLATE: >20 NG/ML (ref 4.8–24.2)
GFR SERPL CREATININE-BSD FRML MDRD: 40 ML/MIN/1.73
GLUCOSE BLD-MCNC: 85 MG/DL (ref 74–99)
HCT VFR BLD CALC: 31.9 % (ref 34–48)
HCT VFR BLD CALC: 33.6 % (ref 34–48)
HDLC SERPL-MCNC: 45 MG/DL
HEMOGLOBIN: 10.5 G/DL (ref 11.5–15.5)
HEMOGLOBIN: 9.9 G/DL (ref 11.5–15.5)
IMMATURE GRANULOCYTES #: 0.03 E9/L
IMMATURE GRANULOCYTES %: 0.4 % (ref 0–5)
INR BLD: 2.4
IRON SATURATION: 56 % (ref 15–50)
IRON: 157 MCG/DL (ref 37–145)
LDL CHOLESTEROL CALCULATED: 119 MG/DL (ref 0–99)
LYMPHOCYTES ABSOLUTE: 1.61 E9/L (ref 1.5–4)
LYMPHOCYTES RELATIVE PERCENT: 20.7 % (ref 20–42)
MAGNESIUM: 2.6 MG/DL (ref 1.6–2.6)
MCH RBC QN AUTO: 28.4 PG (ref 26–35)
MCHC RBC AUTO-ENTMCNC: 31.3 % (ref 32–34.5)
MCV RBC AUTO: 90.8 FL (ref 80–99.9)
MONOCYTES ABSOLUTE: 0.61 E9/L (ref 0.1–0.95)
MONOCYTES RELATIVE PERCENT: 7.8 % (ref 2–12)
NEUTROPHILS ABSOLUTE: 5.41 E9/L (ref 1.8–7.3)
NEUTROPHILS RELATIVE PERCENT: 69.4 % (ref 43–80)
PDW BLD-RTO: 15.5 FL (ref 11.5–15)
PLATELET # BLD: 177 E9/L (ref 130–450)
PMV BLD AUTO: 10.6 FL (ref 7–12)
POTASSIUM SERPL-SCNC: 4.2 MMOL/L (ref 3.5–5)
PROTHROMBIN TIME: 27.9 SEC (ref 9.3–12.4)
RBC # BLD: 3.7 E12/L (ref 3.5–5.5)
SODIUM BLD-SCNC: 141 MMOL/L (ref 132–146)
TOTAL IRON BINDING CAPACITY: 282 MCG/DL (ref 250–450)
TOTAL PROTEIN: 5.9 G/DL (ref 6.4–8.3)
TRIGL SERPL-MCNC: 146 MG/DL (ref 0–149)
TSH SERPL DL<=0.05 MIU/L-ACNC: 2.88 UIU/ML (ref 0.27–4.2)
VITAMIN B-12: 568 PG/ML (ref 211–946)
VLDLC SERPL CALC-MCNC: 29 MG/DL
WBC # BLD: 7.8 E9/L (ref 4.5–11.5)

## 2023-01-19 PROCEDURE — 6370000000 HC RX 637 (ALT 250 FOR IP): Performed by: INTERNAL MEDICINE

## 2023-01-19 PROCEDURE — 85610 PROTHROMBIN TIME: CPT

## 2023-01-19 PROCEDURE — 84443 ASSAY THYROID STIM HORMONE: CPT

## 2023-01-19 PROCEDURE — 85025 COMPLETE CBC W/AUTO DIFF WBC: CPT

## 2023-01-19 PROCEDURE — 2060000000 HC ICU INTERMEDIATE R&B

## 2023-01-19 PROCEDURE — 83735 ASSAY OF MAGNESIUM: CPT

## 2023-01-19 PROCEDURE — 80053 COMPREHEN METABOLIC PANEL: CPT

## 2023-01-19 PROCEDURE — 85014 HEMATOCRIT: CPT

## 2023-01-19 PROCEDURE — 2580000003 HC RX 258: Performed by: INTERNAL MEDICINE

## 2023-01-19 PROCEDURE — 83550 IRON BINDING TEST: CPT

## 2023-01-19 PROCEDURE — 82728 ASSAY OF FERRITIN: CPT

## 2023-01-19 PROCEDURE — A4216 STERILE WATER/SALINE, 10 ML: HCPCS | Performed by: HOSPITALIST

## 2023-01-19 PROCEDURE — 36415 COLL VENOUS BLD VENIPUNCTURE: CPT

## 2023-01-19 PROCEDURE — 83540 ASSAY OF IRON: CPT

## 2023-01-19 PROCEDURE — 85018 HEMOGLOBIN: CPT

## 2023-01-19 PROCEDURE — 2580000003 HC RX 258: Performed by: HOSPITALIST

## 2023-01-19 PROCEDURE — 82607 VITAMIN B-12: CPT

## 2023-01-19 PROCEDURE — C9113 INJ PANTOPRAZOLE SODIUM, VIA: HCPCS | Performed by: HOSPITALIST

## 2023-01-19 PROCEDURE — 82272 OCCULT BLD FECES 1-3 TESTS: CPT

## 2023-01-19 PROCEDURE — 80061 LIPID PANEL: CPT

## 2023-01-19 PROCEDURE — 82746 ASSAY OF FOLIC ACID SERUM: CPT

## 2023-01-19 PROCEDURE — 6360000002 HC RX W HCPCS: Performed by: HOSPITALIST

## 2023-01-19 RX ORDER — LISINOPRIL 10 MG/1
10 TABLET ORAL EVERY MORNING
Status: DISCONTINUED | OUTPATIENT
Start: 2023-01-20 | End: 2023-01-21 | Stop reason: HOSPADM

## 2023-01-19 RX ORDER — SODIUM CHLORIDE 9 MG/ML
INJECTION, SOLUTION INTRAVENOUS CONTINUOUS
Status: DISCONTINUED | OUTPATIENT
Start: 2023-01-19 | End: 2023-01-21 | Stop reason: HOSPADM

## 2023-01-19 RX ADMIN — SODIUM CHLORIDE 8 MG/HR: 9 INJECTION, SOLUTION INTRAVENOUS at 13:44

## 2023-01-19 RX ADMIN — TRAMADOL HYDROCHLORIDE 50 MG: 50 TABLET ORAL at 08:15

## 2023-01-19 RX ADMIN — ALPRAZOLAM 0.25 MG: 0.25 TABLET ORAL at 20:10

## 2023-01-19 RX ADMIN — TRAMADOL HYDROCHLORIDE 50 MG: 50 TABLET ORAL at 20:10

## 2023-01-19 RX ADMIN — VERAPAMIL HYDROCHLORIDE 240 MG: 120 TABLET, FILM COATED, EXTENDED RELEASE ORAL at 08:15

## 2023-01-19 RX ADMIN — LEVOTHYROXINE SODIUM 50 MCG: 0.05 TABLET ORAL at 05:28

## 2023-01-19 RX ADMIN — Medication 1 TABLET: at 08:14

## 2023-01-19 RX ADMIN — SODIUM CHLORIDE: 9 INJECTION, SOLUTION INTRAVENOUS at 15:03

## 2023-01-19 RX ADMIN — FUROSEMIDE 20 MG: 20 TABLET ORAL at 08:14

## 2023-01-19 RX ADMIN — ALLOPURINOL 300 MG: 300 TABLET ORAL at 08:15

## 2023-01-19 RX ADMIN — ATORVASTATIN CALCIUM 10 MG: 10 TABLET, FILM COATED ORAL at 20:10

## 2023-01-19 RX ADMIN — SODIUM CHLORIDE 40 MG: 9 INJECTION, SOLUTION INTRAMUSCULAR; INTRAVENOUS; SUBCUTANEOUS at 13:40

## 2023-01-19 RX ADMIN — ALPRAZOLAM 0.25 MG: 0.25 TABLET ORAL at 08:14

## 2023-01-19 RX ADMIN — CALCITRIOL 0.25 MCG: 0.25 CAPSULE ORAL at 08:14

## 2023-01-19 ASSESSMENT — PAIN SCALES - GENERAL
PAINLEVEL_OUTOF10: 8
PAINLEVEL_OUTOF10: 8

## 2023-01-19 ASSESSMENT — PAIN DESCRIPTION - DESCRIPTORS: DESCRIPTORS: ACHING;DISCOMFORT

## 2023-01-19 ASSESSMENT — PAIN DESCRIPTION - ORIENTATION: ORIENTATION: LOWER

## 2023-01-19 ASSESSMENT — PAIN DESCRIPTION - LOCATION: LOCATION: BACK

## 2023-01-19 NOTE — ACP (ADVANCE CARE PLANNING)
Advance Care Planning   Healthcare Decision Maker:    Primary Decision Maker: Dioni Fernandez [facility] [phone]
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
## 2023-01-19 NOTE — PROGRESS NOTES
Patient refusing to change into a gown and does not want any testing done she plans on leaving tomorrow.  made aware and also of medication discrepancies.

## 2023-01-19 NOTE — H&P
Department of Internal Medicine        CHIEF COMPLAINT: Near syncope    Reason for Admission: Near syncope, GI bleed    HISTORY OF PRESENT ILLNESS:      The patient is a 82 y.o. female who presents with being at a parking lot and became very weak and lightheaded and almost passed out. Patient did not hit her head. Patient stated that she was doing a lot that day and she attributes the episode to just doing too much. Patient is somewhat of a poor historian. Patient has a history of bleeding ulcers in the past and is A. fib with the patient on Coumadin. Patient stools apparently have been very dark at home. INR was 2.9 on admission. Hemoglobin 12.5 on admission repeat this morning 10.5. The BUN/creatinine was elevated 41/1.5 today with repeat 33/1.3. Lactic acid elevated 4.3 but repeat was 1.7. Patient's temperature was 97.9 with heart rate initially 84 and atrial fibs but this morning heart rate is below was 48. Blood pressure 103/53. O2 sat 94% room air at rest.  Currently the patient is very nervous and wants to go home. Patient denies any dark or bloody bowel movements over the last 4 to 6 hours. Past Medical History:    Past Medical History:   Diagnosis Date    AF (atrial fibrillation) (HCC)     leaky valve    Anemia     Anesthesia complication     \"\"during heart cath  and another surgery duodenal ulcer 3rd day post  states    stopped breathing    Arthritis     rheumatoid    Back pain, chronic     ruptured, buldging, sciatica     Bursitis of hip     bilateral   injections given 13    CHF (congestive heart failure) ([address]     Cholelithiasis     Duodenal ulcer with hemorrhage and perforation ([address]     Hx of blood clots ?     right leg    Hyperlipidemia     Hypertension     Kidney stones     Sciatic leg pain     SOB (shortness of breath) on exertion     Thyroid disease     hypo    Unspecified cerebral artery occlusion with cerebral infarction      minor stroke  last   1 st stroke cause vision loss     Past Surgical History:    Past Surgical History:   Procedure Laterality Date    BREAST SURGERY Right     biopsy    CARDIAC CATHETERIZATION   ? \" during procedure\" in [facility] N/A 2021    EGD BIOPSY performed by Cristóbal Cordero MD at [place] [address]       Medications Prior to Admission:    @  Prior to Admission medications    Medication Sig Start Date End Date Taking? Authorizing Provider   calcitRIOL (ROCALTROL) 0.25 MCG capsule Take 0.25 mcg by mouth daily     Historical Provider, MD   allopurinol (ZYLOPRIM) 300 MG tablet Take 300 mg by mouth daily    Historical Provider, MD   lisinopril (PRINIVIL;ZESTRIL) 20 MG tablet Take 20 mg by mouth every morning. Historical Provider, MD   verapamil (VERELAN PM) 240 MG CR capsule Take 240 mg by mouth every morning. Historical Provider, MD   furosemide (LASIX) 40 MG tablet Take 20 mg by mouth daily     Historical Provider, MD   ALPRAZolam (XANAX) 0.25 MG tablet Take 0.25 mg by mouth 2 times daily. Historical Provider, MD   simvastatin (ZOCOR) 10 MG tablet Take 10 mg by mouth nightly. Historical Provider, MD   levothyroxine (SYNTHROID) 50 MCG tablet Take 50 mcg by mouth Daily. Historical Provider, MD   warfarin (COUMADIN) 5 MG tablet Take 5 mg by mouth daily at 1800 Stopped for procedure--Last dose 21    Historical Provider, MD   therapeutic multivitamin-minerals (THERAGRAN-M) tablet Take 1 tablet by mouth daily. Historical Provider, MD   traMADol (ULTRAM) 50 MG tablet Take 50 mg by mouth 2 times daily.     Historical Provider, MD       Allergies:  Penicillins and Sulfa antibiotics    Social History:   Social History     Socioeconomic History    Marital status:      Spouse name: Not on file    Number of children: Not on file    Years of education: Not on file    Highest education level: Not on file   Occupational History    Not on file   Tobacco Use    Smoking status: Never    Smokeless tobacco: Never   Vaping Use    Vaping Use: Never used   Substance and Sexual Activity    Alcohol use: No    Drug use: No    Sexual activity: Not on file   Other Topics Concern    Not on file   Social History Narrative    Not on file     Social Determinants of Health     Financial Resource Strain: Not on file   Food Insecurity: Not on file   Transportation Needs: Not on file   Physical Activity: Not on file   Stress: Not on file   Social Connections: Not on file   Intimate Partner Violence: Not on file   Housing Stability: Not on file       Family History:   History reviewed. No pertinent family history. REVIEW OF SYSTEMS:    Gen: Patient complains of lightheadedness with near syncope. No LOC or syncope. No fevers or chills. HEENT: No earache, sore throat or nasal congestion. Resp: Denies cough, hemoptysis or sputum production. Cardiac: Denies chest pain, SOB, diaphoresis or palpitations. GI: No nausea, vomiting, diarrhea or constipation. No melena or hematochezia. : No urinary complaints, dysuria, hematuria or frequency. MSK: No extremity weakness, paralysis or paresthesias. PHYSICAL EXAM:    Vitals:  BP (!) 103/53   Pulse (!) 48   Temp 98.2 °F (36.8 °C) (Oral)   Resp 16   Ht 5' 4\" (1.626 m)   Wt 176 lb (79.8 kg)   SpO2 94%   BMI 30.21 kg/m²     General:  This is a 82 y.o. yo female who is alert and oriented in NAD  HEENT:  Head is normocephalic and atraumatic, PERRLA, EOMI, mucus membranes moist with no pharyngeal erythema or exudate. Neck:  Supple with no carotid bruits, JVD or thyromegaly.   No cervical adenopathy  CV:  Irregular rate and rhythm, no murmurs  Lungs:  Clear to auscultation bilaterally with no wheezes, rales or rhonchi  Abdomen:  Soft, nontender, nondistended, bowel sounds present  Extremities:  No edema, peripheral pulses intact bilaterally  Neuro:  Cranial nerves II-XII grossly intact; motor and 3 sensory function intact with no focal deficits  Skin:  No rashes, lesions or wounds    DATA:  CBC with Differential:    Lab Results   Component Value Date/Time    WBC 7.8 01/19/2023 06:15 AM    RBC 3.70 01/19/2023 06:15 AM    HGB 10.5 01/19/2023 06:15 AM    HCT 33.6 01/19/2023 06:15 AM     01/19/2023 06:15 AM    MCV 90.8 01/19/2023 06:15 AM    MCH 28.4 01/19/2023 06:15 AM    MCHC 31.3 01/19/2023 06:15 AM    RDW 15.5 01/19/2023 06:15 AM    LYMPHOPCT 20.7 01/19/2023 06:15 AM    MONOPCT 7.8 01/19/2023 06:15 AM    BASOPCT 0.3 01/19/2023 06:15 AM    MONOSABS 0.61 01/19/2023 06:15 AM    LYMPHSABS 1.61 01/19/2023 06:15 AM    EOSABS 0.11 01/19/2023 06:15 AM    BASOSABS 0.02 01/19/2023 06:15 AM     CMP:    Lab Results   Component Value Date/Time     01/19/2023 06:15 AM    K 4.2 01/19/2023 06:15 AM     01/19/2023 06:15 AM    CO2 24 01/19/2023 06:15 AM    BUN 33 01/19/2023 06:15 AM    CREATININE 1.3 01/19/2023 06:15 AM    LABGLOM 40 01/19/2023 06:15 AM    GLUCOSE 85 01/19/2023 06:15 AM    PROT 5.9 01/19/2023 06:15 AM    LABALBU 3.5 01/19/2023 06:15 AM    CALCIUM 8.7 01/19/2023 06:15 AM    BILITOT 0.7 01/19/2023 06:15 AM    ALKPHOS 73 01/19/2023 06:15 AM    AST 17 01/19/2023 06:15 AM    ALT 7 01/19/2023 06:15 AM     Magnesium:    Lab Results   Component Value Date/Time    MG 2.6 01/19/2023 06:15 AM     Phosphorus:  No results found for: PHOS  PT/INR:    Lab Results   Component Value Date/Time    PROTIME 27.9 01/19/2023 06:15 AM    INR 2.4 01/19/2023 06:15 AM     Troponin:  No results found for: TROPONINI  U/A:  No results found for: NITRITE, COLORU, PROTEINU, PHUR, LABCAST, WBCUA, RBCUA, MUCUS, TRICHOMONAS, YEAST, BACTERIA, CLARITYU, SPECGRAV, LEUKOCYTESUR, UROBILINOGEN, BILIRUBINUR, BLOODU, GLUCOSEU, AMORPHOUS  ABG:  No results found for: PH, PCO2, PO2, HCO3, BE, THGB, TCO2, O2SAT  HgBA1c:  No results found for: LABA1C  FLP:    Lab Results   Component Value Date/Time    TRIG 146 01/19/2023 06:15 AM    HDL 45 01/19/2023 06:15 AM    LDLCALC 119 01/19/2023 06:15 AM    LABVLDL 29 01/19/2023 06:15 AM     TSH:    Lab Results   Component Value Date/Time    TSH 2.880 01/19/2023 06:15 AM     IRON:  No results found for: IRON  LIPASE:  No results found for: LIPASE    ASSESSMENT AND PLAN:      Patient Active Problem List    Diagnosis Date Noted    GI bleed 01/18/2023     Impression:  1. Near syncope  2. Possible GI bleeding with melena  3. New onset normocytic anemia  4. Chronic A. fib-on warfarin  5. History hypertension  6. History hyperlipidemia  7. History hypothyroidism  8. Possible acute kidney injury versus chronic kidney disease    Plan:  Patient admitted to monitored bed  Home medications reviewed  Hold warfarin  Daily INRs  Clear liquid diet  Consult GI  Protonix 40 mg IV push twice daily  IV fluids normal saline 60 cc an hour  Stools for occult blood  H&H every 6 hours  Hold Calan with bradycardia    BMP, CBC in a.m.     Hannah Mullen DO, D.O.  1/19/2023  2:13 PM
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
## 2023-01-20 ENCOUNTER — ANESTHESIA (OUTPATIENT)
Dept: ENDOSCOPY | Age: 88
End: 2023-01-20
Payer: MEDICARE

## 2023-01-20 ENCOUNTER — ANESTHESIA EVENT (OUTPATIENT)
Dept: ENDOSCOPY | Age: 88
End: 2023-01-20
Payer: MEDICARE

## 2023-01-20 LAB
ANION GAP SERPL CALCULATED.3IONS-SCNC: 7 MMOL/L (ref 7–16)
BASOPHILS ABSOLUTE: 0.02 E9/L (ref 0–0.2)
BASOPHILS RELATIVE PERCENT: 0.3 % (ref 0–2)
BUN BLDV-MCNC: 28 MG/DL (ref 6–23)
CALCIUM SERPL-MCNC: 8.7 MG/DL (ref 8.6–10.2)
CHLORIDE BLD-SCNC: 112 MMOL/L (ref 98–107)
CO2: 24 MMOL/L (ref 22–29)
CREAT SERPL-MCNC: 1.3 MG/DL (ref 0.5–1)
EOSINOPHILS ABSOLUTE: 0.16 E9/L (ref 0.05–0.5)
EOSINOPHILS RELATIVE PERCENT: 2.5 % (ref 0–6)
GFR SERPL CREATININE-BSD FRML MDRD: 40 ML/MIN/1.73
GLUCOSE BLD-MCNC: 87 MG/DL (ref 74–99)
HCT VFR BLD CALC: 30.8 % (ref 34–48)
HCT VFR BLD CALC: 31 % (ref 34–48)
HCT VFR BLD CALC: 32 % (ref 34–48)
HCT VFR BLD CALC: 32 % (ref 34–48)
HEMOGLOBIN: 10.3 G/DL (ref 11.5–15.5)
HEMOGLOBIN: 10.4 G/DL (ref 11.5–15.5)
HEMOGLOBIN: 9.6 G/DL (ref 11.5–15.5)
HEMOGLOBIN: 9.9 G/DL (ref 11.5–15.5)
IMMATURE GRANULOCYTES #: 0.02 E9/L
IMMATURE GRANULOCYTES %: 0.3 % (ref 0–5)
INR BLD: 1.7
LYMPHOCYTES ABSOLUTE: 1.48 E9/L (ref 1.5–4)
LYMPHOCYTES RELATIVE PERCENT: 23.1 % (ref 20–42)
MCH RBC QN AUTO: 29.6 PG (ref 26–35)
MCHC RBC AUTO-ENTMCNC: 33.2 % (ref 32–34.5)
MCV RBC AUTO: 89.1 FL (ref 80–99.9)
MONOCYTES ABSOLUTE: 0.54 E9/L (ref 0.1–0.95)
MONOCYTES RELATIVE PERCENT: 8.4 % (ref 2–12)
NEUTROPHILS ABSOLUTE: 4.18 E9/L (ref 1.8–7.3)
NEUTROPHILS RELATIVE PERCENT: 65.4 % (ref 43–80)
OCCULT BLOOD DIAGNOSTIC: NORMAL
PDW BLD-RTO: 15.9 FL (ref 11.5–15)
PLATELET # BLD: 180 E9/L (ref 130–450)
PMV BLD AUTO: 10.6 FL (ref 7–12)
POTASSIUM SERPL-SCNC: 4.2 MMOL/L (ref 3.5–5)
PROTHROMBIN TIME: 19.6 SEC (ref 9.3–12.4)
RBC # BLD: 3.48 E12/L (ref 3.5–5.5)
SODIUM BLD-SCNC: 143 MMOL/L (ref 132–146)
WBC # BLD: 6.4 E9/L (ref 4.5–11.5)

## 2023-01-20 PROCEDURE — 3700000001 HC ADD 15 MINUTES (ANESTHESIA): Performed by: INTERNAL MEDICINE

## 2023-01-20 PROCEDURE — 7100000010 HC PHASE II RECOVERY - FIRST 15 MIN: Performed by: INTERNAL MEDICINE

## 2023-01-20 PROCEDURE — 2709999900 HC NON-CHARGEABLE SUPPLY: Performed by: INTERNAL MEDICINE

## 2023-01-20 PROCEDURE — 88305 TISSUE EXAM BY PATHOLOGIST: CPT

## 2023-01-20 PROCEDURE — 3609012400 HC EGD TRANSORAL BIOPSY SINGLE/MULTIPLE: Performed by: INTERNAL MEDICINE

## 2023-01-20 PROCEDURE — 85018 HEMOGLOBIN: CPT

## 2023-01-20 PROCEDURE — 85610 PROTHROMBIN TIME: CPT

## 2023-01-20 PROCEDURE — 3700000000 HC ANESTHESIA ATTENDED CARE: Performed by: INTERNAL MEDICINE

## 2023-01-20 PROCEDURE — 2580000003 HC RX 258

## 2023-01-20 PROCEDURE — 80048 BASIC METABOLIC PNL TOTAL CA: CPT

## 2023-01-20 PROCEDURE — 0DB98ZX EXCISION OF DUODENUM, VIA NATURAL OR ARTIFICIAL OPENING ENDOSCOPIC, DIAGNOSTIC: ICD-10-PCS | Performed by: INTERNAL MEDICINE

## 2023-01-20 PROCEDURE — 6370000000 HC RX 637 (ALT 250 FOR IP): Performed by: INTERNAL MEDICINE

## 2023-01-20 PROCEDURE — 2580000003 HC RX 258: Performed by: HOSPITALIST

## 2023-01-20 PROCEDURE — 85025 COMPLETE CBC W/AUTO DIFF WBC: CPT

## 2023-01-20 PROCEDURE — 6360000002 HC RX W HCPCS: Performed by: HOSPITALIST

## 2023-01-20 PROCEDURE — 85014 HEMATOCRIT: CPT

## 2023-01-20 PROCEDURE — 0DJ08ZZ INSPECTION OF UPPER INTESTINAL TRACT, VIA NATURAL OR ARTIFICIAL OPENING ENDOSCOPIC: ICD-10-PCS | Performed by: INTERNAL MEDICINE

## 2023-01-20 PROCEDURE — 2060000000 HC ICU INTERMEDIATE R&B

## 2023-01-20 PROCEDURE — 2580000003 HC RX 258: Performed by: INTERNAL MEDICINE

## 2023-01-20 PROCEDURE — 6360000002 HC RX W HCPCS

## 2023-01-20 PROCEDURE — C9113 INJ PANTOPRAZOLE SODIUM, VIA: HCPCS | Performed by: HOSPITALIST

## 2023-01-20 PROCEDURE — 36415 COLL VENOUS BLD VENIPUNCTURE: CPT

## 2023-01-20 PROCEDURE — 7100000011 HC PHASE II RECOVERY - ADDTL 15 MIN: Performed by: INTERNAL MEDICINE

## 2023-01-20 PROCEDURE — 0DB68ZX EXCISION OF STOMACH, VIA NATURAL OR ARTIFICIAL OPENING ENDOSCOPIC, DIAGNOSTIC: ICD-10-PCS | Performed by: INTERNAL MEDICINE

## 2023-01-20 RX ORDER — SODIUM CHLORIDE 9 MG/ML
INJECTION, SOLUTION INTRAVENOUS CONTINUOUS PRN
Status: DISCONTINUED | OUTPATIENT
Start: 2023-01-20 | End: 2023-01-20 | Stop reason: SDUPTHER

## 2023-01-20 RX ORDER — PROPOFOL 10 MG/ML
INJECTION, EMULSION INTRAVENOUS PRN
Status: DISCONTINUED | OUTPATIENT
Start: 2023-01-20 | End: 2023-01-20 | Stop reason: SDUPTHER

## 2023-01-20 RX ADMIN — SODIUM CHLORIDE 8 MG/HR: 9 INJECTION, SOLUTION INTRAVENOUS at 08:26

## 2023-01-20 RX ADMIN — SODIUM CHLORIDE: 9 INJECTION, SOLUTION INTRAVENOUS at 09:35

## 2023-01-20 RX ADMIN — LEVOTHYROXINE SODIUM 50 MCG: 0.05 TABLET ORAL at 06:22

## 2023-01-20 RX ADMIN — SODIUM CHLORIDE: 900 INJECTION, SOLUTION INTRAVENOUS at 15:05

## 2023-01-20 RX ADMIN — TRAMADOL HYDROCHLORIDE 50 MG: 50 TABLET ORAL at 21:37

## 2023-01-20 RX ADMIN — CALCITRIOL 0.25 MCG: 0.25 CAPSULE ORAL at 08:27

## 2023-01-20 RX ADMIN — LISINOPRIL 10 MG: 10 TABLET ORAL at 08:28

## 2023-01-20 RX ADMIN — ATORVASTATIN CALCIUM 10 MG: 10 TABLET, FILM COATED ORAL at 21:37

## 2023-01-20 RX ADMIN — PROPOFOL 70 MG: 10 INJECTION, EMULSION INTRAVENOUS at 15:30

## 2023-01-20 RX ADMIN — FUROSEMIDE 20 MG: 20 TABLET ORAL at 08:27

## 2023-01-20 RX ADMIN — ALPRAZOLAM 0.25 MG: 0.25 TABLET ORAL at 21:37

## 2023-01-20 RX ADMIN — ALLOPURINOL 300 MG: 300 TABLET ORAL at 08:27

## 2023-01-20 RX ADMIN — VERAPAMIL HYDROCHLORIDE 240 MG: 120 TABLET, FILM COATED, EXTENDED RELEASE ORAL at 08:28

## 2023-01-20 RX ADMIN — TRAMADOL HYDROCHLORIDE 50 MG: 50 TABLET ORAL at 08:27

## 2023-01-20 RX ADMIN — Medication 1 TABLET: at 08:27

## 2023-01-20 RX ADMIN — ALPRAZOLAM 0.25 MG: 0.25 TABLET ORAL at 08:27

## 2023-01-20 ASSESSMENT — ENCOUNTER SYMPTOMS: SHORTNESS OF BREATH: 1

## 2023-01-20 ASSESSMENT — PAIN DESCRIPTION - LOCATION
LOCATION: BACK
LOCATION: BACK

## 2023-01-20 ASSESSMENT — PAIN DESCRIPTION - DESCRIPTORS
DESCRIPTORS: ACHING
DESCRIPTORS: ACHING;DISCOMFORT

## 2023-01-20 ASSESSMENT — PAIN SCALES - GENERAL
PAINLEVEL_OUTOF10: 6
PAINLEVEL_OUTOF10: 0
PAINLEVEL_OUTOF10: 7
PAINLEVEL_OUTOF10: 0

## 2023-01-20 ASSESSMENT — PAIN DESCRIPTION - ORIENTATION: ORIENTATION: MID

## 2023-01-20 NOTE — PROGRESS NOTES
Patient noted for bradycardia. Heart rate-40s-60's Dr. Catana Hammans aware. No new orders Patient alert and oriented, asymptomatic. Okay to return to 6th floor. Nurse to nurse given and charge nurse aware.
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
## 2023-01-20 NOTE — PROGRESS NOTES
Department of Internal Medicine        CHIEF COMPLAINT: Near syncope    Reason for Admission: Near syncope, GI bleed    HISTORY OF PRESENT ILLNESS:      The patient is a 82 y.o. female who presents with being at a parking lot and became very weak and lightheaded and almost passed out. Patient did not hit her head. Patient stated that she was doing a lot that day and she attributes the episode to just doing too much. Patient is somewhat of a poor historian. Patient has a history of bleeding ulcers in the past and is A. fib with the patient on Coumadin. Patient stools apparently have been very dark at home. INR was 2.9 on admission. Hemoglobin 12.5 on admission repeat this morning 10.5. The BUN/creatinine was elevated 41/1.5 today with repeat 33/1.3. Lactic acid elevated 4.3 but repeat was 1.7. Patient's temperature was 97.9 with heart rate initially 84 and atrial fibs but this morning heart rate is below was 48. Blood pressure 103/53. O2 sat 94% room air at rest.  Currently the patient is very nervous and wants to go home. Patient denies any dark or bloody bowel movements over the last 4 to 6 hours. 2023  Patient seen examined on [facility]. Patient states she is nervous because of the scope. Patient denies any chest or abdominal pain. There is no dizziness or unusual shortness of breath. BUN/creatinine 28/1.3 with essentially normal electrolytes. WBC 6.4 and hemoglobin 10.3. INR is 1.7 today. Stool for occult blood was positive temperature is 98.3 with heart rate 77 blood pressure 130/66. O2 sat 95% room air at rest.  Patient is set up for EGD today.     Past Medical History:    Past Medical History:   Diagnosis Date    AF (atrial fibrillation) (HCC)     leaky valve    Anemia     Anesthesia complication     \"\"during heart cath  and another surgery duodenal ulcer 3rd day post  states    stopped breathing    Arthritis     rheumatoid    Back pain, chronic     ruptured, buldging, sciatica Bursitis of hip     bilateral   injections given 13    CHF (congestive heart failure) ([facility]     Cholelithiasis     [facility]    Hx of blood clots ? right leg    Hyperlipidemia     Hypertension     Kidney stones     Sciatic leg pain     SOB (shortness of breath) on exertion     Thyroid disease     hypo    Unspecified cerebral artery occlusion with cerebral infarction      minor stroke  last   1 st stroke  cause vision loss     Past Surgical History:    Past Surgical History:   Procedure Laterality Date    BREAST SURGERY Right     biopsy    CARDIAC CATHETERIZATION   ? \" during procedure\" in [facility] N/A 2021    EGD BIOPSY performed by Jewel Alonso MD at [place] [address]       Medications Prior to Admission:    @  Prior to Admission medications    Medication Sig Start Date End Date Taking? Authorizing Provider   calcitRIOL (ROCALTROL) 0.25 MCG capsule Take 0.25 mcg by mouth daily     Historical Provider, MD   allopurinol (ZYLOPRIM) 300 MG tablet Take 300 mg by mouth daily    Historical Provider, MD   lisinopril (PRINIVIL;ZESTRIL) 20 MG tablet Take 20 mg by mouth every morning. Historical Provider, MD   verapamil (VERELAN PM) 240 MG CR capsule Take 240 mg by mouth every morning. Historical Provider, MD   furosemide (LASIX) 40 MG tablet Take 20 mg by mouth daily     Historical Provider, MD   ALPRAZolam (XANAX) 0.25 MG tablet Take 0.25 mg by mouth 2 times daily. Historical Provider, MD   simvastatin (ZOCOR) 10 MG tablet Take 10 mg by mouth nightly. Historical Provider, MD   levothyroxine (SYNTHROID) 50 MCG tablet Take 50 mcg by mouth Daily.     Historical Provider, MD   warfarin (COUMADIN) 5 MG tablet Take 5 mg by mouth daily at 1800 Stopped for procedure--Last dose 21    Historical Provider, MD   therapeutic multivitamin-minerals [facility]) tablet Take 1 tablet by mouth daily. Historical Provider, MD   traMADol (ULTRAM) 50 MG tablet Take 50 mg by mouth 2 times daily. Historical Provider, MD       Allergies:  Penicillins and Sulfa antibiotics    Social History:   Social History     Socioeconomic History    Marital status:      Spouse name: Not on file    Number of children: Not on file    Years of education: Not on file    Highest education level: Not on file   Occupational History    Not on file   Tobacco Use    Smoking status: Never    Smokeless tobacco: Never   Vaping Use    Vaping Use: Never used   Substance and Sexual Activity    Alcohol use: No    Drug use: No    Sexual activity: Not on file   Other Topics Concern    Not on file   Social History Narrative    Not on file     Social Determinants of Health     Financial Resource Strain: Not on file   Food Insecurity: Not on file   Transportation Needs: Not on file   Physical Activity: Not on file   Stress: Not on file   Social Connections: Not on file   Intimate Partner Violence: Not on file   Housing Stability: Not on file       Family History:   History reviewed. No pertinent family history. REVIEW OF SYSTEMS:    Gen: Patient complains of lightheadedness with near syncope. No LOC or syncope. No fevers or chills. HEENT: No earache, sore throat or nasal congestion. Resp: Denies cough, hemoptysis or sputum production. Cardiac: Denies chest pain, SOB, diaphoresis or palpitations. GI: No nausea, vomiting, diarrhea or constipation. No melena or hematochezia. : No urinary complaints, dysuria, hematuria or frequency. MSK: No extremity weakness, paralysis or paresthesias.      PHYSICAL EXAM:    Vitals:  /66   Pulse 77   Temp 98 °F (36.7 °C) (Oral)   Resp 18   Ht 5' 4\" (1.626 m)   Wt 176 lb (79.8 kg)   SpO2 95%   BMI 30.21 kg/m²     General:  This is a 82 y.o. yo female who is alert and oriented in NAD  HEENT:  Head is normocephalic and atraumatic, PERRLA, EOMI, mucus membranes moist with no pharyngeal erythema or exudate. Neck:  Supple with no carotid bruits, JVD or thyromegaly.   No cervical adenopathy  CV:  Irregular rate and rhythm, no murmurs  Lungs:  Clear to auscultation bilaterally with no wheezes, rales or rhonchi  Abdomen:  Soft, nontender, nondistended, bowel sounds present  Extremities:  No edema, peripheral pulses intact bilaterally  Neuro:  Cranial nerves II-XII grossly intact; motor and sensory function intact with no focal deficits  Skin:  No rashes, lesions or wounds    DATA:  CBC with Differential:    Lab Results   Component Value Date/Time    WBC 6.4 01/20/2023 07:54 AM    RBC 3.48 01/20/2023 07:54 AM    HGB 10.3 01/20/2023 07:54 AM    HCT 31.0 01/20/2023 07:54 AM     01/20/2023 07:54 AM    MCV 89.1 01/20/2023 07:54 AM    MCH 29.6 01/20/2023 07:54 AM    MCHC 33.2 01/20/2023 07:54 AM    RDW 15.9 01/20/2023 07:54 AM    LYMPHOPCT 23.1 01/20/2023 07:54 AM    MONOPCT 8.4 01/20/2023 07:54 AM    BASOPCT 0.3 01/20/2023 07:54 AM    MONOSABS 0.54 01/20/2023 07:54 AM    LYMPHSABS 1.48 01/20/2023 07:54 AM    EOSABS 0.16 01/20/2023 07:54 AM    BASOSABS 0.02 01/20/2023 07:54 AM     CMP:    Lab Results   Component Value Date/Time     01/20/2023 07:54 AM    K 4.2 01/20/2023 07:54 AM     01/20/2023 07:54 AM    CO2 24 01/20/2023 07:54 AM    BUN 28 01/20/2023 07:54 AM    CREATININE 1.3 01/20/2023 07:54 AM    LABGLOM 40 01/20/2023 07:54 AM    GLUCOSE 87 01/20/2023 07:54 AM    PROT 5.9 01/19/2023 06:15 AM    LABALBU 3.5 01/19/2023 06:15 AM    CALCIUM 8.7 01/20/2023 07:54 AM    BILITOT 0.7 01/19/2023 06:15 AM    ALKPHOS 73 01/19/2023 06:15 AM    AST 17 01/19/2023 06:15 AM    ALT 7 01/19/2023 06:15 AM     Magnesium:    Lab Results   Component Value Date/Time    MG 2.6 01/19/2023 06:15 AM     Phosphorus:  No results found for: PHOS  PT/INR:    Lab Results   Component Value Date/Time    PROTIME 19.6 01/20/2023 07:54 AM    INR 1.7 01/20/2023 07:54 AM     Troponin:  No results found for: TROPONINI  U/A:  No results found for: NITRITE, COLORU, PROTEINU, PHUR, LABCAST, WBCUA, RBCUA, MUCUS, TRICHOMONAS, YEAST, BACTERIA, CLARITYU, SPECGRAV, LEUKOCYTESUR, UROBILINOGEN, BILIRUBINUR, BLOODU, GLUCOSEU, AMORPHOUS  ABG:  No results found for: PH, PCO2, PO2, HCO3, BE, THGB, TCO2, O2SAT  HgBA1c:  No results found for: LABA1C  FLP:    Lab Results   Component Value Date/Time    TRIG 146 01/19/2023 06:15 AM    HDL 45 01/19/2023 06:15 AM    LDLCALC 119 01/19/2023 06:15 AM    LABVLDL 29 01/19/2023 06:15 AM     TSH:    Lab Results   Component Value Date/Time    TSH 2.880 01/19/2023 06:15 AM     IRON:    Lab Results   Component Value Date/Time    IRON 157 01/19/2023 03:48 PM     LIPASE:  No results found for: LIPASE    ASSESSMENT AND PLAN:      Patient Active Problem List    Diagnosis Date Noted    GI bleed 01/18/2023     Impression:  1. Near syncope  2. Possible GI bleeding with melena  3. New onset normocytic anemia  4. Chronic A. fib-on warfarin-episodes of bradycardia  5. History hypertension  6. History hyperlipidemia  7. History hypothyroidism  8. Possible acute kidney injury versus chronic kidney disease  9. Plan:  Patient admitted to monitored bed  Home medications reviewed  Hold warfarin  Daily INRs  Clear liquid diet  Consult GI  Protonix 40 mg IV push twice daily  IV fluids normal saline 60 cc an hour  Stools for occult blood  H&H every 6 hours  Hold Calan with bradycardia    EGD 1/20    BMP, CBC in a.m.     Aman Leon DO, D.O.  1/20/2023  11:29 AM

## 2023-01-20 NOTE — ANESTHESIA PRE PROCEDURE
Department of Anesthesiology  Preprocedure Note       Name:  Paige Bishop   Age:  82 y.o.  :  1935                                          MRN:  [id number]         Date:  2023      Surgeon: Faheem Contreras):  Melly Young DO    Procedure: Procedure(s):  EGD ESOPHAGOGASTRODUODENOSCOPY    Medications prior to admission:   Prior to Admission medications    Medication Sig Start Date End Date Taking? Authorizing Provider   calcitRIOL (ROCALTROL) 0.25 MCG capsule Take 0.25 mcg by mouth daily     Historical Provider, MD   allopurinol (ZYLOPRIM) 300 MG tablet Take 300 mg by mouth daily    Historical Provider, MD   lisinopril (PRINIVIL;ZESTRIL) 20 MG tablet Take 20 mg by mouth every morning. Historical Provider, MD   verapamil (VERELAN PM) 240 MG CR capsule Take 240 mg by mouth every morning. Historical Provider, MD   furosemide (LASIX) 40 MG tablet Take 20 mg by mouth daily     Historical Provider, MD   ALPRAZolam (XANAX) 0.25 MG tablet Take 0.25 mg by mouth 2 times daily. Historical Provider, MD   simvastatin (ZOCOR) 10 MG tablet Take 10 mg by mouth nightly. Historical Provider, MD   levothyroxine (SYNTHROID) 50 MCG tablet Take 50 mcg by mouth Daily. Historical Provider, MD   warfarin (COUMADIN) 5 MG tablet Take 5 mg by mouth daily at 1800 Stopped for procedure--Last dose 21    Historical Provider, MD   therapeutic multivitamin-minerals (THERAGRAN-M) tablet Take 1 tablet by mouth daily. Historical Provider, MD   traMADol (ULTRAM) 50 MG tablet Take 50 mg by mouth 2 times daily.     Historical Provider, MD       Current medications:    Current Facility-Administered Medications   Medication Dose Route Frequency Provider Last Rate Last Admin    pantoprazole (PROTONIX) 80 mg in sodium chloride 0.9 % 100 mL infusion  8 mg/hr IntraVENous Continuous Gina Cleveland MD 10 mL/hr at 23 0826 8 mg/hr at 23 08    lisinopril (PRINIVIL;ZESTRIL) tablet 10 mg  10 mg Oral QAM Toyin Punt, DO   10 mg at 23 0828    0.9 % sodium chloride infusion   IntraVENous Continuous Toyin Punt, DO 60 mL/hr at 23 0935 New Bag at 23 0935    verapamil (CALAN SR) extended release tablet 240 mg  240 mg Oral QAM Toyin Punt, DO   240 mg at 23 3654    furosemide (LASIX) tablet 20 mg  20 mg Oral Daily Toyin Punt, DO   20 mg at 23 08    ALPRAZolam (XANAX) tablet 0.25 mg  0.25 mg Oral BID Toyin Punt, DO   0.25 mg at 23 08    levothyroxine (SYNTHROID) tablet 50 mcg  50 mcg Oral Daily Toyin Punt, DO   50 mcg at 23 7055    therapeutic multivitamin-minerals 1 tablet  1 tablet Oral Daily Toyin Punt, DO   1 tablet at 23 08    traMADol (ULTRAM) tablet 50 mg  50 mg Oral BID Toyin Punt, DO   50 mg at 23 5401    calcitRIOL (ROCALTROL) capsule 0.25 mcg  0.25 mcg Oral Daily Toyin Punt, DO   0.25 mcg at 23    allopurinol (ZYLOPRIM) tablet 300 mg  300 mg Oral Daily Toyin Punt, DO   300 mg at 23    atorvastatin (LIPITOR) tablet 10 mg  10 mg Oral Nightly Toyin Punt, DO   10 mg at 23    acetaminophen (TYLENOL) tablet 500 mg  500 mg Oral Q6H PRN Toyin Punt, DO        prochlorperazine (COMPAZINE) injection 5 mg  5 mg IntraVENous Q6H PRN Toyin Punt, DO        polyethylene glycol (GLYCOLAX) packet 17 g  17 g Oral Daily PRN Toyin Punt, DO           Allergies:     Allergies   Allergen Reactions    Penicillins Anaphylaxis    Sulfa Antibiotics Nausea And Vomiting     Stomach pain        Problem List:    Patient Active Problem List   Diagnosis Code    GI bleed K92.2       Past Medical History:        Diagnosis Date    AF (atrial fibrillation) (HCC)     leaky valve    Anemia     Anesthesia complication     \"\"during heart cath  and another surgery duodenal ulcer 3rd day post  states    stopped breathing    Arthritis     rheumatoid    Back pain, chronic     ruptured, buldging, sciatica    • Bursitis of hip     bilateral   injections given 13   • CHF (congestive heart failure) (HCC)    • Cholelithiasis    • Duodenal ulcer with hemorrhage and perforation (HCC)    • Hx of blood clots ?    right leg   • Hyperlipidemia    • Hypertension    • Kidney stones    • Sciatic leg pain    • SOB (shortness of breath) on exertion    • Thyroid disease     hypo   • Unspecified cerebral artery occlusion with cerebral infarction      minor stroke  last   1 st stroke  cause vision loss       Past Surgical History:        Procedure Laterality Date   • BREAST SURGERY Right     biopsy   • CARDIAC CATHETERIZATION   ?    \" during procedure\" in [place]   • CYSTOSCOPY     • TONSILLECTOMY     • UPPER GASTROINTESTINAL ENDOSCOPY N/A 2021    EGD BIOPSY performed by Shamir Kumar MD at [facility] ENDOSCOPY       Social History:    Social History     Tobacco Use   • Smoking status: Never   • Smokeless tobacco: Never   Substance Use Topics   • Alcohol use: No                                Counseling given: Not Answered      Vital Signs (Current):   Vitals:    23 2040 23 0509 23 0815 23 0857   BP:  128/66 130/66    Pulse:  84 77    Resp: 16 16 16 18   Temp:  36.7 °C (98 °F) 36.7 °C (98 °F)    TempSrc:  Oral Oral    SpO2:       Weight:       Height:                                                  BP Readings from Last 3 Encounters:   23 130/66   21 (!) 107/58   21 135/73       NPO Status:  NPO >8 hours last PO intake 23 2200                                                                               BMI:   Wt Readings from Last 3 Encounters:   23 176 lb (79.8 kg)   21 169 lb (76.7 kg)   20 170 lb (77.1 kg)     Body mass index is 30.21 kg/m².    CBC:   Lab Results   Component Value Date/Time    WBC 6.4 2023 07:54 AM    RBC 3.48 2023 07:54 AM    HGB 10.3 2023 07:54 AM    HCT  31.0 01/20/2023 07:54 AM    MCV 89.1 01/20/2023 07:54 AM    RDW 15.9 01/20/2023 07:54 AM     01/20/2023 07:54 AM       CMP:   Lab Results   Component Value Date/Time     01/20/2023 07:54 AM    K 4.2 01/20/2023 07:54 AM     01/20/2023 07:54 AM    CO2 24 01/20/2023 07:54 AM    BUN 28 01/20/2023 07:54 AM    CREATININE 1.3 01/20/2023 07:54 AM    LABGLOM 40 01/20/2023 07:54 AM    GLUCOSE 87 01/20/2023 07:54 AM    PROT 5.9 01/19/2023 06:15 AM    CALCIUM 8.7 01/20/2023 07:54 AM    BILITOT 0.7 01/19/2023 06:15 AM    ALKPHOS 73 01/19/2023 06:15 AM    AST 17 01/19/2023 06:15 AM    ALT 7 01/19/2023 06:15 AM       POC Tests: No results for input(s): POCGLU, POCNA, POCK, POCCL, POCBUN, POCHEMO, POCHCT in the last 72 hours.     Coags:   Lab Results   Component Value Date/Time    PROTIME 19.6 01/20/2023 07:54 AM    INR 1.7 01/20/2023 07:54 AM    APTT 56.3 01/18/2023 03:39 PM       HCG (If Applicable): No results found for: PREGTESTUR, PREGSERUM, HCG, HCGQUANT     ABGs: No results found for: PHART, PO2ART, YCG9HAW, NHM1NKH, BEART, P1NUMHIM     Type & Screen (If Applicable):  No results found for: LABABO, LABRH    Drug/Infectious Status (If Applicable):  No results found for: HIV, HEPCAB    COVID-19 Screening (If Applicable): No results found for: COVID19        Anesthesia Evaluation  Patient summary reviewed and Nursing notes reviewed no history of anesthetic complications:   Airway: Mallampati: III  TM distance: >3 FB   Neck ROM: full  Mouth opening: > = 3 FB   Dental:    (+) upper dentures      Pulmonary:normal exam    (+) shortness of breath (with exertion):                             Cardiovascular:    (+) hypertension:, dysrhythmias: atrial fibrillation, CHF:, GARCIA:, hyperlipidemia        Rhythm: regular  Rate: normal           Beta Blocker:  Not on Beta Blocker         Neuro/Psych:   (+) CVA:, neuromuscular disease (sciatica LLE):,             GI/Hepatic/Renal:   (+) PUD, renal disease: CRI, Endo/Other:    (+) : arthritis: rheumatoid. , malignancy/cancer (colon ca). Abdominal:             Vascular:   + DVT (states hx of DVT RLE), . Other Findings:           Anesthesia Plan      MAC     ASA 3       Induction: intravenous. Anesthetic plan and risks discussed with patient. Use of blood products discussed with patient whom consented to blood products. Plan discussed with CRNA and attending.                     Nehemiah Wheatley RN   1/20/2023      Reviewed RACHID Mcodnald - CRNA  1/20/23

## 2023-01-20 NOTE — PLAN OF CARE
Problem: Discharge Planning  Goal: Discharge to home or other facility with appropriate resources  Outcome: Progressing     Problem: Risk for Elopement  Goal: Patient will not exit the unit/facility without proper excort  Outcome: Progressing     Problem: Pain  Goal: Verbalizes/displays adequate comfort level or baseline comfort level  Outcome: Progressing     Problem: Safety - Adult  Goal: Free from fall injury  Outcome: Progressing     Problem: ABCDS Injury Assessment  Goal: Absence of physical injury  Outcome: Progressing

## 2023-01-20 NOTE — PROCEDURES
Angeli York is a 82 y.o. female patient. 1. Gastrointestinal hemorrhage, unspecified gastrointestinal hemorrhage type    2. Lactic acid acidosis    3. MALCOLM (acute kidney injury) ([facility] [address]    4. Near syncope      Past Medical History:   Diagnosis Date    AF (atrial fibrillation) (HCC)     leaky valve    Anemia     Anesthesia complication     \"\"during heart cath  and another surgery duodenal ulcer 3rd day post  states    stopped breathing    Arthritis     rheumatoid    Back pain, chronic     ruptured, buldging, sciatica     Bursitis of hip     bilateral   injections given 13    CHF (congestive heart failure) ([facility] [address]     Cholelithiasis     Duodenal ulcer with hemorrhage and perforation ([facility] [address] 1960    Hx of blood clots ? right leg    Hyperlipidemia     Hypertension     Kidney stones     Sciatic leg pain     SOB (shortness of breath) on exertion     Thyroid disease     hypo    Unspecified cerebral artery occlusion with cerebral infarction      minor stroke  last   1 st stroke  cause vision loss     Blood pressure 130/66, pulse 77, temperature 98 °F (36.7 °C), temperature source Oral, resp. rate 18, height 5' 4\" (1.626 m), weight 176 lb (79.8 kg), SpO2 95 %, not currently breastfeeding. Procedures  Procedure:  Esophagogastroduodenoscopy    Indication:  Acute blood loss anemia, melena    Sedation:  MAC    EBL: None       Endoscope was advanced easily through mouth to second portion of duodenum      Oropharynx views are limited but grossly normal.    Esophagus:   Mucosa is normal.  GEJ at 40 cm. Stomach:   Antrum shows moderate gastritis. Gastric body is normal.    Retroflexed views show normal fundus and cardia. Duodenum: Bulb is normal.    Second portion of duodenum is normal.    Bx Celiac    IMPRESSION AND PLAN:     1. Moderate gastritis. No ulcerations, masses or bleeding was noted. Biopsies as above. [facility] for Regular diet. Serial H/H Q12 Hrs, transfuse PRN.  Consider inpatient vs outpatient colonoscopy pending patient's clinical course. Follow up as outpatient in office, call [phone] to schedule for appointment.     Vance Nolan,   1/20/2023

## 2023-01-20 NOTE — ANESTHESIA POSTPROCEDURE EVALUATION
Department of Anesthesiology  Postprocedure Note    Patient: Jose Carlos Reynolds  MRN: [id number]  YOB: 1935  Date of evaluation: 1/20/2023      Procedure Summary     Date: 01/20/23 Room / Location: [address] [place] [address] 01 / [address] [facility]    Anesthesia Start: 97 70 84 Anesthesia Stop: 9750    Procedure: EGD BIOPSY Diagnosis:       Anemia, unspecified type      (Anemia, unspecified type [D64.9])    Surgeons: Leonidas Andersen DO Responsible Provider: Tova Hurd MD    Anesthesia Type: MAC ASA Status: 3          Anesthesia Type: MAC    Kelsie Phase I:      Kelsie Phase II: Kelsie Score: 9      Anesthesia Post Evaluation    Patient location during evaluation: PACU  Patient participation: complete - patient participated  Level of consciousness: awake  Airway patency: patent  Nausea & Vomiting: no nausea and no vomiting  Complications: no  Cardiovascular status: hemodynamically stable  Respiratory status: acceptable  Hydration status: euvolemic

## 2023-01-21 VITALS
OXYGEN SATURATION: 93 % | SYSTOLIC BLOOD PRESSURE: 163 MMHG | TEMPERATURE: 97.7 F | HEART RATE: 70 BPM | BODY MASS INDEX: 30.05 KG/M2 | RESPIRATION RATE: 18 BRPM | HEIGHT: 64 IN | DIASTOLIC BLOOD PRESSURE: 73 MMHG | WEIGHT: 176 LBS

## 2023-01-21 LAB
ANION GAP SERPL CALCULATED.3IONS-SCNC: 9 MMOL/L (ref 7–16)
BASOPHILS ABSOLUTE: 0.02 E9/L (ref 0–0.2)
BASOPHILS RELATIVE PERCENT: 0.3 % (ref 0–2)
BUN BLDV-MCNC: 21 MG/DL (ref 6–23)
CALCIUM SERPL-MCNC: 8.6 MG/DL (ref 8.6–10.2)
CHLORIDE BLD-SCNC: 108 MMOL/L (ref 98–107)
CO2: 23 MMOL/L (ref 22–29)
CREAT SERPL-MCNC: 1.2 MG/DL (ref 0.5–1)
EOSINOPHILS ABSOLUTE: 0.18 E9/L (ref 0.05–0.5)
EOSINOPHILS RELATIVE PERCENT: 2.5 % (ref 0–6)
GFR SERPL CREATININE-BSD FRML MDRD: 44 ML/MIN/1.73
GLUCOSE BLD-MCNC: 72 MG/DL (ref 74–99)
HCT VFR BLD CALC: 31.1 % (ref 34–48)
HCT VFR BLD CALC: 31.3 % (ref 34–48)
HEMOGLOBIN: 10.1 G/DL (ref 11.5–15.5)
HEMOGLOBIN: 9.5 G/DL (ref 11.5–15.5)
IMMATURE GRANULOCYTES #: 0.02 E9/L
IMMATURE GRANULOCYTES %: 0.3 % (ref 0–5)
INR BLD: 1.4
LYMPHOCYTES ABSOLUTE: 1.51 E9/L (ref 1.5–4)
LYMPHOCYTES RELATIVE PERCENT: 21.1 % (ref 20–42)
MCH RBC QN AUTO: 28.1 PG (ref 26–35)
MCHC RBC AUTO-ENTMCNC: 30.4 % (ref 32–34.5)
MCV RBC AUTO: 92.6 FL (ref 80–99.9)
MONOCYTES ABSOLUTE: 0.53 E9/L (ref 0.1–0.95)
MONOCYTES RELATIVE PERCENT: 7.4 % (ref 2–12)
NEUTROPHILS ABSOLUTE: 4.89 E9/L (ref 1.8–7.3)
NEUTROPHILS RELATIVE PERCENT: 68.4 % (ref 43–80)
PDW BLD-RTO: 15.8 FL (ref 11.5–15)
PLATELET # BLD: 164 E9/L (ref 130–450)
PMV BLD AUTO: 11.1 FL (ref 7–12)
POTASSIUM SERPL-SCNC: 3.9 MMOL/L (ref 3.5–5)
PROTHROMBIN TIME: 16.4 SEC (ref 9.3–12.4)
RBC # BLD: 3.38 E12/L (ref 3.5–5.5)
SODIUM BLD-SCNC: 140 MMOL/L (ref 132–146)
WBC # BLD: 7.2 E9/L (ref 4.5–11.5)

## 2023-01-21 PROCEDURE — 85025 COMPLETE CBC W/AUTO DIFF WBC: CPT

## 2023-01-21 PROCEDURE — 36415 COLL VENOUS BLD VENIPUNCTURE: CPT

## 2023-01-21 PROCEDURE — 6370000000 HC RX 637 (ALT 250 FOR IP): Performed by: INTERNAL MEDICINE

## 2023-01-21 PROCEDURE — 85610 PROTHROMBIN TIME: CPT

## 2023-01-21 PROCEDURE — 85018 HEMOGLOBIN: CPT

## 2023-01-21 PROCEDURE — 85014 HEMATOCRIT: CPT

## 2023-01-21 PROCEDURE — 80048 BASIC METABOLIC PNL TOTAL CA: CPT

## 2023-01-21 PROCEDURE — 2580000003 HC RX 258: Performed by: INTERNAL MEDICINE

## 2023-01-21 PROCEDURE — 6360000002 HC RX W HCPCS: Performed by: INTERNAL MEDICINE

## 2023-01-21 RX ORDER — POLYETHYLENE GLYCOL 3350 17 G/17G
17 POWDER, FOR SOLUTION ORAL DAILY PRN
Qty: 527 G | Refills: 1 | Status: SHIPPED | OUTPATIENT
Start: 2023-01-21 | End: 2023-02-20

## 2023-01-21 RX ORDER — PANTOPRAZOLE SODIUM 40 MG/1
40 TABLET, DELAYED RELEASE ORAL
Status: DISCONTINUED | OUTPATIENT
Start: 2023-01-22 | End: 2023-01-21 | Stop reason: HOSPADM

## 2023-01-21 RX ORDER — PANTOPRAZOLE SODIUM 40 MG/1
40 TABLET, DELAYED RELEASE ORAL
Qty: 30 TABLET | Refills: 3 | Status: SHIPPED | OUTPATIENT
Start: 2023-01-22

## 2023-01-21 RX ORDER — WARFARIN SODIUM 5 MG/1
5 TABLET ORAL DAILY
Status: DISCONTINUED | OUTPATIENT
Start: 2023-01-21 | End: 2023-01-21 | Stop reason: HOSPADM

## 2023-01-21 RX ORDER — ENOXAPARIN SODIUM 100 MG/ML
60 INJECTION SUBCUTANEOUS DAILY
Status: DISCONTINUED | OUTPATIENT
Start: 2023-01-21 | End: 2023-01-21 | Stop reason: HOSPADM

## 2023-01-21 RX ADMIN — ENOXAPARIN SODIUM 60 MG: 100 INJECTION SUBCUTANEOUS at 10:24

## 2023-01-21 RX ADMIN — Medication 1 TABLET: at 08:06

## 2023-01-21 RX ADMIN — ALLOPURINOL 300 MG: 300 TABLET ORAL at 08:06

## 2023-01-21 RX ADMIN — CALCITRIOL 0.25 MCG: 0.25 CAPSULE ORAL at 08:05

## 2023-01-21 RX ADMIN — VERAPAMIL HYDROCHLORIDE 240 MG: 120 TABLET, FILM COATED, EXTENDED RELEASE ORAL at 08:06

## 2023-01-21 RX ADMIN — SODIUM CHLORIDE: 9 INJECTION, SOLUTION INTRAVENOUS at 05:11

## 2023-01-21 RX ADMIN — LISINOPRIL 10 MG: 10 TABLET ORAL at 08:06

## 2023-01-21 RX ADMIN — LEVOTHYROXINE SODIUM 50 MCG: 0.05 TABLET ORAL at 05:16

## 2023-01-21 RX ADMIN — FUROSEMIDE 20 MG: 20 TABLET ORAL at 08:05

## 2023-01-21 RX ADMIN — TRAMADOL HYDROCHLORIDE 50 MG: 50 TABLET ORAL at 08:05

## 2023-01-21 RX ADMIN — ALPRAZOLAM 0.25 MG: 0.25 TABLET ORAL at 08:06

## 2023-01-21 ASSESSMENT — PAIN SCALES - GENERAL
PAINLEVEL_OUTOF10: 0
PAINLEVEL_OUTOF10: 0

## 2023-01-21 NOTE — DISCHARGE SUMMARY
Department of Internal Medicine        CHIEF COMPLAINT: Near syncope    Reason for Admission: Near syncope, GI bleed    HISTORY OF PRESENT ILLNESS:      The patient is a 82 y.o. female who presents with being at a parking lot and became very weak and lightheaded and almost passed out. Patient did not hit her head. Patient stated that she was doing a lot that day and she attributes the episode to just doing too much. Patient is somewhat of a poor historian. Patient has a history of bleeding ulcers in the past and is A. fib with the patient on Coumadin. Patient stools apparently have been very dark at home. INR was 2.9 on admission. Hemoglobin 12.5 on admission repeat this morning 10.5. The BUN/creatinine was elevated 41/1.5 today with repeat 33/1.3. Lactic acid elevated 4.3 but repeat was 1.7. Patient's temperature was 97.9 with heart rate initially 84 and atrial fibs but this morning heart rate is below was 48. Blood pressure 103/53. O2 sat 94% room air at rest.  Currently the patient is very nervous and wants to go home. Patient denies any dark or bloody bowel movements over the last 4 to 6 hours. 1/20/2023  Patient seen examined on [facility]. Patient states she is nervous because of the scope. Patient denies any chest or abdominal pain. There is no dizziness or unusual shortness of breath. BUN/creatinine 28/1.3 with essentially normal electrolytes. WBC 6.4 and hemoglobin 10.3. INR is 1.7 today. Stool for occult blood was positive temperature is 98.3 with heart rate 77 blood pressure 130/66. O2 sat 95% room air at rest.  Patient is set up for EGD today. 1/21/2023  Patient seen examined on [facility]. EGD yesterday showed moderate gastritis. Patient denies any chest pain, abdominal pain, nausea or vomiting. Patient denies any melena or hematochezia. BUN/creatinine 21/1.2 with normal electrolytes. WBC was 7.2 with hemoglobin 9.5. INR is down to 1.4.   Temperature 97.7 with heart rate of 70 and blood pressure ranges 104 55-1 163/73. O2 sat is 93% on room air at rest.  Patient states that she feels good and wants to go home. Patient states she had recent colonoscopy. Patient states she has had 2 strokes and she says both are apparently from her atrial for. With this patient will be put back on her warfarin and this should be followed closely with her primary care physician along with monitoring hemoglobin over the next few months. Risk and benefit of the warfarin should be discussed with patient by PCP and cardiologist outpatient if the patient continues to become anemic. Patient will then have to be followed up with GI outpatient with probable capsule endoscopy. Patient stable for discharge. Past Medical History:    Past Medical History:   Diagnosis Date    AF (atrial fibrillation) (HCC)     leaky valve    Anemia     Anesthesia complication     \"\"during heart cath  and another surgery duodenal ulcer 3rd day post  states    stopped breathing    Arthritis     rheumatoid    Back pain, chronic     ruptured, buldging, sciatica     Bursitis of hip     bilateral   injections given 13    CHF (congestive heart failure) ([facility] [address]     Cholelithiasis     Duodenal ulcer with hemorrhage and perforation ([facility] [address] 1960    Hx of blood clots ? right leg    Hyperlipidemia     Hypertension     Kidney stones     Sciatic leg pain     SOB (shortness of breath) on exertion     Thyroid disease     hypo    Unspecified cerebral artery occlusion with cerebral infarction      minor stroke  last   1 st stroke  cause vision loss     Past Surgical History:    Past Surgical History:   Procedure Laterality Date    BREAST SURGERY Right     biopsy    CARDIAC CATHETERIZATION   ?     \" during procedure\" in [facility] N/A 2021    EGD BIOPSY performed by Cristóbal Cordero MD at [place] [address]       Medications Prior to Admission:    @  Prior to Admission medications    Medication Sig Start Date End Date Taking? Authorizing Provider   calcitRIOL (ROCALTROL) 0.25 MCG capsule Take 0.25 mcg by mouth daily Mon wed fri    Historical Provider, MD   allopurinol (ZYLOPRIM) 300 MG tablet Take 300 mg by mouth daily    Historical Provider, MD   lisinopril (PRINIVIL;ZESTRIL) 20 MG tablet Take 20 mg by mouth every morning. Historical Provider, MD   verapamil (VERELAN PM) 240 MG CR capsule Take 240 mg by mouth every morning. Historical Provider, MD   furosemide (LASIX) 40 MG tablet Take 20 mg by mouth daily     Historical Provider, MD   ALPRAZolam (XANAX) 0.25 MG tablet Take 0.25 mg by mouth 2 times daily. Historical Provider, MD   simvastatin (ZOCOR) 10 MG tablet Take 10 mg by mouth nightly. Historical Provider, MD   levothyroxine (SYNTHROID) 50 MCG tablet Take 50 mcg by mouth Daily. Historical Provider, MD   warfarin (COUMADIN) 5 MG tablet Take 5 mg by mouth daily at 1800 Stopped for procedure--Last dose 9/20/21    Historical Provider, MD   therapeutic multivitamin-minerals (THERAGRAN-M) tablet Take 1 tablet by mouth daily. Historical Provider, MD   traMADol (ULTRAM) 50 MG tablet Take 50 mg by mouth 2 times daily.     Historical Provider, MD       Allergies:  Penicillins and Sulfa antibiotics    Social History:   Social History     Socioeconomic History    Marital status:      Spouse name: Not on file    Number of children: Not on file    Years of education: Not on file    Highest education level: Not on file   Occupational History    Not on file   Tobacco Use    Smoking status: Never    Smokeless tobacco: Never   Vaping Use    Vaping Use: Never used   Substance and Sexual Activity    Alcohol use: No    Drug use: No    Sexual activity: Not on file   Other Topics Concern    Not on file   Social History Narrative    Not on file     Social Determinants of Health     Financial Resource Strain: Not on file   Food Insecurity: Not on file Transportation Needs: Not on file   Physical Activity: Not on file   Stress: Not on file   Social Connections: Not on file   Intimate Partner Violence: Not on file   Housing Stability: Not on file       Family History:   History reviewed. No pertinent family history. REVIEW OF SYSTEMS:    Gen: Patient complains of lightheadedness with near syncope. No LOC or syncope. No fevers or chills. HEENT: No earache, sore throat or nasal congestion. Resp: Denies cough, hemoptysis or sputum production. Cardiac: Denies chest pain, SOB, diaphoresis or palpitations. GI: No nausea, vomiting, diarrhea or constipation. No melena or hematochezia. : No urinary complaints, dysuria, hematuria or frequency. MSK: No extremity weakness, paralysis or paresthesias. PHYSICAL EXAM:    Vitals:  BP (!) 163/73   Pulse 70   Temp 97.7 °F (36.5 °C) (Oral)   Resp 18   Ht 5' 4\" (1.626 m)   Wt 176 lb (79.8 kg)   SpO2 93%   BMI 30.21 kg/m²     General:  This is a 82 y.o. yo female who is alert and oriented in NAD  HEENT:  Head is normocephalic and atraumatic, PERRLA, EOMI, mucus membranes moist with no pharyngeal erythema or exudate. Neck:  Supple with no carotid bruits, JVD or thyromegaly.   No cervical adenopathy  CV:  Irregular rate and rhythm, no murmurs  Lungs:  Clear to auscultation bilaterally with no wheezes, rales or rhonchi  Abdomen:  Soft, nontender, nondistended, bowel sounds present  Extremities:  No edema, peripheral pulses intact bilaterally  Neuro:  Cranial nerves II-XII grossly intact; motor and sensory function intact with no focal deficits  Skin:  No rashes, lesions or wounds    DATA:  CBC with Differential:    Lab Results   Component Value Date/Time    WBC 7.2 01/21/2023 05:52 AM    RBC 3.38 01/21/2023 05:52 AM    HGB 9.5 01/21/2023 05:52 AM    HCT 31.3 01/21/2023 05:52 AM     01/21/2023 05:52 AM    MCV 92.6 01/21/2023 05:52 AM    MCH 28.1 01/21/2023 05:52 AM    MCHC 30.4 01/21/2023 05:52 AM    RDW 15.8 01/21/2023 05:52 AM    LYMPHOPCT 21.1 01/21/2023 05:52 AM    MONOPCT 7.4 01/21/2023 05:52 AM    BASOPCT 0.3 01/21/2023 05:52 AM    MONOSABS 0.53 01/21/2023 05:52 AM    LYMPHSABS 1.51 01/21/2023 05:52 AM    EOSABS 0.18 01/21/2023 05:52 AM    BASOSABS 0.02 01/21/2023 05:52 AM     CMP:    Lab Results   Component Value Date/Time     01/21/2023 05:52 AM    K 3.9 01/21/2023 05:52 AM     01/21/2023 05:52 AM    CO2 23 01/21/2023 05:52 AM    BUN 21 01/21/2023 05:52 AM    CREATININE 1.2 01/21/2023 05:52 AM    LABGLOM 44 01/21/2023 05:52 AM    GLUCOSE 72 01/21/2023 05:52 AM    PROT 5.9 01/19/2023 06:15 AM    LABALBU 3.5 01/19/2023 06:15 AM    CALCIUM 8.6 01/21/2023 05:52 AM    BILITOT 0.7 01/19/2023 06:15 AM    ALKPHOS 73 01/19/2023 06:15 AM    AST 17 01/19/2023 06:15 AM    ALT 7 01/19/2023 06:15 AM     Magnesium:    Lab Results   Component Value Date/Time    MG 2.6 01/19/2023 06:15 AM     Phosphorus:  No results found for: PHOS  PT/INR:    Lab Results   Component Value Date/Time    PROTIME 16.4 01/21/2023 05:52 AM    INR 1.4 01/21/2023 05:52 AM     Troponin:  No results found for: TROPONINI  U/A:  No results found for: NITRITE, COLORU, PROTEINU, PHUR, LABCAST, WBCUA, RBCUA, MUCUS, TRICHOMONAS, YEAST, BACTERIA, CLARITYU, SPECGRAV, LEUKOCYTESUR, UROBILINOGEN, BILIRUBINUR, BLOODU, GLUCOSEU, AMORPHOUS  ABG:  No results found for: PH, PCO2, PO2, HCO3, BE, THGB, TCO2, O2SAT  HgBA1c:  No results found for: LABA1C  FLP:    Lab Results   Component Value Date/Time    TRIG 146 01/19/2023 06:15 AM    HDL 45 01/19/2023 06:15 AM    LDLCALC 119 01/19/2023 06:15 AM    LABVLDL 29 01/19/2023 06:15 AM     TSH:    Lab Results   Component Value Date/Time    TSH 2.880 01/19/2023 06:15 AM     IRON:    Lab Results   Component Value Date/Time    IRON 157 01/19/2023 03:48 PM     LIPASE:  No results found for: LIPASE    ASSESSMENT AND PLAN:      Patient Active Problem List    Diagnosis Date Noted    GI bleed 01/18/2023 Impression:  1. Near syncope  2. Possible GI bleeding with melena  3. New onset normocytic anemia  4. Chronic A. fib-on warfarin-episodes of bradycardia  5. History hypertension  6. History hyperlipidemia  7. History hypothyroidism  8. Acute kidney injury with chronic kidney disease stage IIIb  9. Moderate gastritis-EGD 1/20  10.   History of small CVA x2 per patient    Plan:  Discharge home    Prescription for Protonix 40 mg p.o. daily  Continue warfarin    Continue other home meds    Follow-up with PCP next week    Prescription given for outpatient CBC in 1 week    Patient to follow-up with cardiology outpatient as directed    Magnus Abbott DO, [facility]  1/21/2023  9:33 AM

## 2023-01-21 NOTE — PROGRESS NOTES
PROGRESS NOTE  By Elvin Sanchez M.D. [facility]  Dr. Marjie Cushing, M.D.,  Dr. Paulo Ewing M.D.,   Dr. Indra Good D.O.,  Dr. Ibrahima Joyce M.D.,  Dr. Xavier Katz D.O.,          Geeta Nureman  82 y.o.  female    SUBJECTIVE:  Abdominal pain. Denies nausea or vomiting    OBJECTIVE:    BP (!) 163/73   Pulse 70   Temp 97.7 °F (36.5 °C) (Oral)   Resp 18   Ht 5' 4\" (1.626 m)   Wt 176 lb (79.8 kg)   SpO2 93%   BMI 30.21 kg/m²     General: NAD/ female  HEENT: Anicteric sclera/moist oral mucosa  Neck: Supple with trachea midline  Chest: Symmetric excursion/nonlabored respirations  Cor: Irregular  Abd.: Soft/appears nontender  Extr.:  Decreased muscle tone and bulk throughout  Skin: Warm and dry      DATA:    Monitor data reviewed -atrial fibrillation noted. Stool (measured) : 0 mL  Lab Results   Component Value Date/Time    WBC 7.2 01/21/2023 05:52 AM    RBC 3.38 01/21/2023 05:52 AM    HGB 10.1 01/21/2023 09:13 AM    HCT 31.1 01/21/2023 09:13 AM    MCV 92.6 01/21/2023 05:52 AM    MCH 28.1 01/21/2023 05:52 AM    MCHC 30.4 01/21/2023 05:52 AM    RDW 15.8 01/21/2023 05:52 AM     01/21/2023 05:52 AM    MPV 11.1 01/21/2023 05:52 AM     Lab Results   Component Value Date/Time     01/21/2023 05:52 AM    K 3.9 01/21/2023 05:52 AM     01/21/2023 05:52 AM    CO2 23 01/21/2023 05:52 AM    BUN 21 01/21/2023 05:52 AM    CREATININE 1.2 01/21/2023 05:52 AM    CALCIUM 8.6 01/21/2023 05:52 AM    PROT 5.9 01/19/2023 06:15 AM    LABALBU 3.5 01/19/2023 06:15 AM    BILITOT 0.7 01/19/2023 06:15 AM    ALKPHOS 73 01/19/2023 06:15 AM    AST 17 01/19/2023 06:15 AM    ALT 7 01/19/2023 06:15 AM     No results found for: LIPASE  No results found for: AMYLASE      ASSESSMENT/PLAN:  Patient Active Problem List   Diagnosis    GI bleed     1.   Anemia/GI bleed  -New anemia  -Normocytic  -EGD 1/20 revealing moderate gastritis but no active bleeding  -Stable H&H  -Decrease frequency of H&H checks  -Daily PPI including on discharge  -Reported recent colonoscopy - If H&H remaining stable plan for outpatient procedure-High     2. History of CRC  -Attempt to obtain records from most recent colonoscopy -pending  -Colonoscopy as above     3. History of cardiac issues  -Elevated troponin  -Per admitting    Okay to restart oral anticoagulation from GI POV. Okay to be discharged from GI POV. Follow-up in 2 to 3 weeks after discharge  -patient to call for appointment and with questions/concerns at [phone]. Thank you for the opportunity to participate in the care of Mrs. Ras Roman. Bean Mujica MD  1/21/2023  10:14 AM    NOTE:  This report was transcribed using voice recognition software. Every effort was made to ensure accuracy; however, inadvertent computerized transcription errors may be present.

## 2023-01-21 NOTE — PLAN OF CARE
Problem: Discharge Planning  Goal: Discharge to home or other facility with appropriate resources  Outcome: Progressing     Problem: Risk for Elopement  Goal: Patient will not exit the unit/facility without proper excort  Outcome: Progressing     Problem: Pain  Goal: Verbalizes/displays adequate comfort level or baseline comfort level  Outcome: Progressing     Problem: Safety - Adult  Goal: Free from fall injury  Outcome: Progressing     Problem: ABCDS Injury Assessment  Goal: Absence of physical injury  Outcome: Progressing     Problem: Cardiovascular - Adult  Goal: Maintains optimal cardiac output and hemodynamic stability  Outcome: Progressing     Problem: Cardiovascular - Adult  Goal: Absence of cardiac dysrhythmias or at baseline  Outcome: Progressing     Problem: Skin/Tissue Integrity - Adult  Goal: Skin integrity remains intact  Outcome: Progressing     Problem: Musculoskeletal - Adult  Goal: Return mobility to safest level of function  Outcome: Progressing     Problem: Chronic Conditions and Co-morbidities  Goal: Patient's chronic conditions and co-morbidity symptoms are monitored and maintained or improved  Outcome: Progressing

## 2023-01-23 LAB
EKG ATRIAL RATE: 66 BPM
EKG Q-T INTERVAL: 388 MS
EKG QRS DURATION: 92 MS
EKG QTC CALCULATION (BAZETT): 439 MS
EKG R AXIS: -45 DEGREES
EKG T AXIS: -9 DEGREES
EKG VENTRICULAR RATE: 77 BPM

## 2025-08-15 ENCOUNTER — TRANSCRIBE ORDERS (OUTPATIENT)
Dept: ADMINISTRATIVE | Age: 89
End: 2025-08-15

## 2025-08-15 DIAGNOSIS — R10.11 ABDOMINAL PAIN, RIGHT UPPER QUADRANT: Primary | ICD-10-CM

## (undated) DEVICE — GOWN ISOLATN REG YEL M WT MULTIPLY SIDETIE LEV 2

## (undated) DEVICE — CLOTH SURG PREP PREOPERATIVE CHLORHEXIDINE GLUC 2% READYPREP

## (undated) DEVICE — KIT BEDSIDE REVITAL OX 500ML

## (undated) DEVICE — FORCEPS BX L240CM JAW DIA2.8MM L CAP W/ NDL MIC MESH TOOTH

## (undated) DEVICE — 6 X 9  1.75MIL 4-WALL LABGUARD: Brand: MINIGRIP COMMERCIAL LLC

## (undated) DEVICE — YANKAUER,BULB TIP,W/O VENT,RIGID,STERILE: Brand: MEDLINE

## (undated) DEVICE — ADAPTER CLEANING PORPOISE CLEANING

## (undated) DEVICE — MASK,FACE,MAXFLUIDPROTECT,SHIELD/ERLPS: Brand: MEDLINE

## (undated) DEVICE — Device: Brand: DEFENDO VALVE AND CONNECTOR KIT

## (undated) DEVICE — COVER,LIGHT HANDLE,FLX,1/PK: Brand: MEDLINE INDUSTRIES, INC.

## (undated) DEVICE — BLOCK BITE 60FR CAREGUARD

## (undated) DEVICE — LUBRICANT SURG JELLY ST BACTER TUBE 4.25OZ

## (undated) DEVICE — KENDALL 450 SERIES MONITORING FOAM ELECTRODE - RECTANGULAR SHAPE ( 3/PK): Brand: KENDALL

## (undated) DEVICE — SPONGE GZ 4IN 4IN 4 PLY N WVN AVANT

## (undated) DEVICE — TOWEL,OR,DSP,ST,BLUE,STD,6/PK,12PK/CS: Brand: MEDLINE

## (undated) DEVICE — TUBING, SUCTION, 1/4" X 10', STRAIGHT: Brand: MEDLINE

## (undated) DEVICE — CONTAINER SPEC COLL 960ML POLYPR TRIANG GRAD INTAKE/OUTPUT